# Patient Record
Sex: MALE | Race: BLACK OR AFRICAN AMERICAN | NOT HISPANIC OR LATINO | Employment: OTHER | URBAN - METROPOLITAN AREA
[De-identification: names, ages, dates, MRNs, and addresses within clinical notes are randomized per-mention and may not be internally consistent; named-entity substitution may affect disease eponyms.]

---

## 2017-02-13 ENCOUNTER — HOSPITAL ENCOUNTER (OUTPATIENT)
Dept: RADIOLOGY | Facility: HOSPITAL | Age: 62
Discharge: HOME | End: 2017-02-13
Attending: INTERNAL MEDICINE
Payer: COMMERCIAL

## 2017-02-13 DIAGNOSIS — C61 MALIGNANT NEOPLASM OF PROSTATE: ICD-10-CM

## 2017-02-13 PROCEDURE — 78306 BONE IMAGING WHOLE BODY: CPT | Mod: 26,,, | Performed by: RADIOLOGY

## 2017-02-13 PROCEDURE — A9503 TC99M MEDRONATE: HCPCS

## 2019-10-02 ENCOUNTER — OFFICE VISIT (OUTPATIENT)
Dept: INTERNAL MEDICINE | Facility: CLINIC | Age: 64
End: 2019-10-02
Payer: MEDICARE

## 2019-10-02 VITALS
DIASTOLIC BLOOD PRESSURE: 80 MMHG | WEIGHT: 291.69 LBS | OXYGEN SATURATION: 98 % | HEART RATE: 80 BPM | SYSTOLIC BLOOD PRESSURE: 140 MMHG

## 2019-10-02 DIAGNOSIS — E11.3292 TYPE 2 DIABETES MELLITUS WITH MILD NONPROLIFERATIVE RETINOPATHY OF LEFT EYE, WITH LONG-TERM CURRENT USE OF INSULIN, MACULAR EDEMA PRESENCE UNSPECIFIED: ICD-10-CM

## 2019-10-02 DIAGNOSIS — Z79.4 TYPE 2 DIABETES MELLITUS WITH MILD NONPROLIFERATIVE RETINOPATHY OF LEFT EYE, WITH LONG-TERM CURRENT USE OF INSULIN, MACULAR EDEMA PRESENCE UNSPECIFIED: ICD-10-CM

## 2019-10-02 DIAGNOSIS — Z76.89 ENCOUNTER TO ESTABLISH CARE: Primary | ICD-10-CM

## 2019-10-02 DIAGNOSIS — I10 ESSENTIAL HYPERTENSION: ICD-10-CM

## 2019-10-02 DIAGNOSIS — Z85.46 HISTORY OF PROSTATE CANCER: ICD-10-CM

## 2019-10-02 PROBLEM — N52.9 ED (ERECTILE DYSFUNCTION): Status: ACTIVE | Noted: 2019-10-02

## 2019-10-02 PROCEDURE — 99999 PR PBB SHADOW E&M-EST. PATIENT-LVL IV: ICD-10-PCS | Mod: PBBFAC,,, | Performed by: PHYSICIAN ASSISTANT

## 2019-10-02 PROCEDURE — 1101F PR PT FALLS ASSESS DOC 0-1 FALLS W/OUT INJ PAST YR: ICD-10-PCS | Mod: CPTII,S$GLB,, | Performed by: PHYSICIAN ASSISTANT

## 2019-10-02 PROCEDURE — 1101F PT FALLS ASSESS-DOCD LE1/YR: CPT | Mod: CPTII,S$GLB,, | Performed by: PHYSICIAN ASSISTANT

## 2019-10-02 PROCEDURE — 99999 PR PBB SHADOW E&M-EST. PATIENT-LVL IV: CPT | Mod: PBBFAC,,, | Performed by: PHYSICIAN ASSISTANT

## 2019-10-02 PROCEDURE — 99203 OFFICE O/P NEW LOW 30 MIN: CPT | Mod: S$GLB,,, | Performed by: PHYSICIAN ASSISTANT

## 2019-10-02 PROCEDURE — 99203 PR OFFICE/OUTPT VISIT, NEW, LEVL III, 30-44 MIN: ICD-10-PCS | Mod: S$GLB,,, | Performed by: PHYSICIAN ASSISTANT

## 2019-10-02 RX ORDER — ASPIRIN 81 MG/1
81 TABLET ORAL DAILY
COMMUNITY

## 2019-10-02 RX ORDER — SENNOSIDES 8.6 MG/1
1 TABLET ORAL DAILY
COMMUNITY
End: 2022-10-25

## 2019-10-02 RX ORDER — ATORVASTATIN CALCIUM 80 MG/1
80 TABLET, FILM COATED ORAL DAILY
COMMUNITY

## 2019-10-02 RX ORDER — INSULIN GLARGINE 100 [IU]/ML
20 INJECTION, SOLUTION SUBCUTANEOUS DAILY
COMMUNITY

## 2019-10-02 RX ORDER — LANOLIN ALCOHOL/MO/W.PET/CERES
100 CREAM (GRAM) TOPICAL DAILY
COMMUNITY

## 2019-10-02 RX ORDER — SILDENAFIL 100 MG/1
100 TABLET, FILM COATED ORAL DAILY PRN
COMMUNITY

## 2019-10-02 RX ORDER — INSULIN ASPART 100 [IU]/ML
5 INJECTION, SUSPENSION SUBCUTANEOUS
COMMUNITY
End: 2021-03-11

## 2019-10-02 RX ORDER — SENNOSIDES 8.6 MG/1
1 TABLET ORAL DAILY
COMMUNITY

## 2019-10-02 RX ORDER — SENNOSIDES 8.6 MG/1
1 TABLET ORAL DAILY
COMMUNITY
End: 2021-03-11

## 2019-10-03 NOTE — PROGRESS NOTES
Subjective:       Patient ID: Russell Warner is a 64 y.o. male.    Chief Complaint: Follow-up    HPI    Established pt of Desirae Barrios MD (new to me)    [facility], [facility]o and [facility]).     He had an eye exam earlier today and he was told to bring th results to a primary care provider. He chose [facility], recently rcvd Medicare as he turned 64 today. He is unsure if he would like to transfer care from the [facility]. Main concern is following up on his eye exam today.     I reviewed report (copy below)  That reports recommend of carotid doppler 2/2 asymmetric NPDR that was found on exam.     DM: On Levemir 20units and Aspart 5 units BID. Denies p/p/p. Thinks his last A1c was around 7%. He [facility]    HLD: On Statin therapy Lipitor 80mg    HTN: Unable to recall his BP meds, not listed on med list he has in clinic. /80. Denies cp or sob.    ED: Improved with Viagra PRN    Hx of Prostate Cancer: S/p XRT at [facility] in 2017.            Past Medical History:   Diagnosis Date    Arthritis     Diabetes mellitus, type 2     Hyperlipidemia     Prostate cancer     radiation 2017 at [facility]     Social History     Tobacco Use    Smoking status: Never Smoker    Smokeless tobacco: Never Used   Substance Use Topics    Alcohol use: Not on file     Comment: occ    Drug use: Never     Review of patient's allergies indicates:  No Known Allergies    History reviewed. No pertinent family history.    Past Surgical History:   Procedure Laterality Date    EYE SURGERY      LUNG BIOPSY      SHOULDER SURGERY Right        Current Outpatient Medications:     alcohol antiseptic pads (ALCOHOL PREP PADS TOP), Apply topically., Disp: , Rfl:     aspirin (ECOTRIN) 81 MG EC tablet, Take 81 mg by mouth once daily., Disp: , Rfl:     atorvastatin (LIPITOR) 80 MG tablet, Take 80 mg by mouth once daily., Disp: , Rfl:     blood sugar diagnostic (BLOOD GLUCOSE TEST) Strp, by  Misc.(Non-Drug; Combo Route) route., Disp: , Rfl:     collagenase (SANTYL) ointment, Apply topically 2 (two) times daily., Disp: , Rfl:     cyanocobalamin (VITAMIN B-12) 1000 MCG tablet, Take 100 mcg by mouth once daily., Disp: , Rfl:     insulin aspart protamine-insulin aspart (NOVOLOG 70/30) 100 unit/mL (70-30) InPn pen, Inject 5 Units into the skin 2 (two) times daily before meals., Disp: , Rfl:     insulin glargine (LANTUS) 100 unit/mL injection, Inject 20 Units into the skin once daily., Disp: , Rfl:     lancing device (ACCU-CHEK SOFTCLIX LANCET DEV MISC), by Misc.(Non-Drug; Combo Route) route., Disp: , Rfl:     pen needle, diabetic (NOVOFINE 32 MISC), by Misc.(Non-Drug; Combo Route) route., Disp: , Rfl:     senna (EVAC-U-GEN, SENNOSIDES,) 8.6 mg tablet, Take 1 tablet by mouth once daily., Disp: , Rfl:     senna (EVAC-U-GEN, SENNOSIDES,) 8.6 mg tablet, Take 1 tablet by mouth once daily., Disp: , Rfl:     senna (SENOKOT) 8.6 mg tablet, Take 1 tablet by mouth once daily., Disp: , Rfl:     sildenafil (VIAGRA) 100 MG tablet, Take 100 mg by mouth daily as needed for Erectile Dysfunction., Disp: , Rfl:       Review of Systems   Constitutional: Negative for chills, fever and unexpected weight change.   Respiratory: Negative for cough and shortness of breath.    Cardiovascular: Negative for chest pain and leg swelling.   Gastrointestinal: Positive for constipation (occ). Negative for abdominal pain, nausea and vomiting.   Musculoskeletal: Positive for arthralgias.   Skin: Negative for rash.   Neurological: Negative for weakness and headaches.       Objective: BP (!) 140/80   Pulse 80   Wt 132.3 kg (291 lb 10.7 oz)   SpO2 98%         Physical Exam   Constitutional: He is oriented to person, place, and time. He appears well-developed and well-nourished. No distress.   HENT:   Head: Normocephalic and atraumatic.   Mouth/Throat: Oropharynx is clear and moist.   Eyes: Pupils are equal, round, and reactive to  light.   Cardiovascular: Normal rate and regular rhythm. Exam reveals no friction rub.   No murmur heard.  Pulmonary/Chest: Effort normal and breath sounds normal. He has no wheezes. He has no rales.   Abdominal: Soft. Bowel sounds are normal. There is no tenderness.   Musculoskeletal: He exhibits no edema.   Ambulating with cane   Neurological: He is alert and oriented to person, place, and time.   Skin: Skin is warm and dry. No rash noted.   Psychiatric: He has a normal mood and affect.   Vitals reviewed.      Assessment:       1. Encounter to establish care    2. 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 Essential hypertension    4. History of prostate cancer        Plan:         Russell was seen today for follow-up.    Diagnoses and all orders for this visit:    Encounter to establish care  Has appt to establish with Dr. 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

## 2019-11-13 ENCOUNTER — TELEPHONE (OUTPATIENT)
Dept: INTERNAL MEDICINE | Facility: CLINIC | Age: 64
End: 2019-11-13

## 2019-11-13 NOTE — TELEPHONE ENCOUNTER
Dec 5th appt needs to be rescheduled. If unable to reach on phone please cancel appt and send patient a letter.

## 2019-11-13 NOTE — LETTER
[facility]  [address]  [facility] [address]  Phone: [phone]  Fax: [phone]           Dear Russell Warner,        The office has tried calling you to inform you that we do have to cancel your appointment on December 5th.  will not be in the office on that day. Please give us a call so we can reschedule your appointment. We do apologize for the inconvienience. Thank you!

## 2021-03-11 ENCOUNTER — OFFICE VISIT (OUTPATIENT)
Dept: INTERNAL MEDICINE | Facility: CLINIC | Age: 66
End: 2021-03-11
Payer: MEDICARE

## 2021-03-11 ENCOUNTER — HOSPITAL ENCOUNTER (OUTPATIENT)
Dept: RADIOLOGY | Facility: HOSPITAL | Age: 66
Discharge: HOME | End: 2021-03-11
Attending: INTERNAL MEDICINE
Payer: MEDICARE

## 2021-03-11 VITALS
OXYGEN SATURATION: 98 % | HEART RATE: 77 BPM | BODY MASS INDEX: 37.56 KG/M2 | SYSTOLIC BLOOD PRESSURE: 128 MMHG | WEIGHT: 308.44 LBS | HEIGHT: 76 IN | DIASTOLIC BLOOD PRESSURE: 82 MMHG

## 2021-03-11 DIAGNOSIS — M25.559 HIP PAIN: ICD-10-CM

## 2021-03-11 DIAGNOSIS — I73.9 PERIPHERAL VASCULAR DISEASE: ICD-10-CM

## 2021-03-11 DIAGNOSIS — M17.0 PRIMARY OSTEOARTHRITIS OF BOTH KNEES: ICD-10-CM

## 2021-03-11 DIAGNOSIS — Z89.411 ACQUIRED ABSENCE OF RIGHT GREAT TOE: ICD-10-CM

## 2021-03-11 DIAGNOSIS — Z79.4 TYPE 2 DIABETES MELLITUS WITH MILD NONPROLIFERATIVE RETINOPATHY WITHOUT MACULAR EDEMA, WITH LONG-TERM CURRENT USE OF INSULIN, UNSPECIFIED LATERALITY: ICD-10-CM

## 2021-03-11 DIAGNOSIS — Z00.00 ANNUAL PHYSICAL EXAM: Primary | ICD-10-CM

## 2021-03-11 DIAGNOSIS — E11.3299 TYPE 2 DIABETES MELLITUS WITH MILD NONPROLIFERATIVE RETINOPATHY WITHOUT MACULAR EDEMA, WITH LONG-TERM CURRENT USE OF INSULIN, UNSPECIFIED LATERALITY: ICD-10-CM

## 2021-03-11 DIAGNOSIS — M54.9 CHRONIC BACK PAIN, UNSPECIFIED BACK LOCATION, UNSPECIFIED BACK PAIN LATERALITY: ICD-10-CM

## 2021-03-11 DIAGNOSIS — E66.01 MORBID (SEVERE) OBESITY DUE TO EXCESS CALORIES: ICD-10-CM

## 2021-03-11 DIAGNOSIS — G89.29 CHRONIC BACK PAIN, UNSPECIFIED BACK LOCATION, UNSPECIFIED BACK PAIN LATERALITY: ICD-10-CM

## 2021-03-11 DIAGNOSIS — M79.673 PAIN OF FOOT, UNSPECIFIED LATERALITY: ICD-10-CM

## 2021-03-11 DIAGNOSIS — M25.511 RIGHT SHOULDER PAIN, UNSPECIFIED CHRONICITY: ICD-10-CM

## 2021-03-11 DIAGNOSIS — Z11.59 ENCOUNTER FOR HEPATITIS C SCREENING TEST FOR LOW RISK PATIENT: ICD-10-CM

## 2021-03-11 LAB
ALBUMIN/CREAT UR: 100.3 UG/MG (ref 0–30)
BACTERIA #/AREA URNS AUTO: ABNORMAL /HPF
BILIRUB UR QL STRIP: NEGATIVE
CLARITY UR REFRACT.AUTO: ABNORMAL
COLOR UR AUTO: YELLOW
CREAT UR-MCNC: 352 MG/DL (ref 23–375)
GLUCOSE UR QL STRIP: NEGATIVE
HGB UR QL STRIP: NEGATIVE
HYALINE CASTS UR QL AUTO: 3 /LPF
KETONES UR QL STRIP: NEGATIVE
LEUKOCYTE ESTERASE UR QL STRIP: NEGATIVE
MICROALBUMIN UR DL<=1MG/L-MCNC: 353 UG/ML
MICROSCOPIC COMMENT: ABNORMAL
NITRITE UR QL STRIP: NEGATIVE
PH UR STRIP: 5 [PH] (ref 5–8)
PROT UR QL STRIP: ABNORMAL
RBC #/AREA URNS AUTO: 2 /HPF (ref 0–4)
SP GR UR STRIP: 1.02 (ref 1–1.03)
SQUAMOUS #/AREA URNS AUTO: 0 /HPF
URN SPEC COLLECT METH UR: ABNORMAL
WBC #/AREA URNS AUTO: 2 /HPF (ref 0–5)

## 2021-03-11 PROCEDURE — 73564 XR KNEE COMP 4 OR MORE VIEWS BILAT: ICD-10-PCS | Mod: 26,50,, | Performed by: RADIOLOGY

## 2021-03-11 PROCEDURE — 73564 X-RAY EXAM KNEE 4 OR MORE: CPT | Mod: TC,50

## 2021-03-11 PROCEDURE — 73502 X-RAY EXAM HIP UNI 2-3 VIEWS: CPT | Mod: TC,RT

## 2021-03-11 PROCEDURE — 1125F AMNT PAIN NOTED PAIN PRSNT: CPT | Mod: S$GLB,,, | Performed by: INTERNAL MEDICINE

## 2021-03-11 PROCEDURE — 99214 PR OFFICE/OUTPT VISIT, EST, LEVL IV, 30-39 MIN: ICD-10-PCS | Mod: S$GLB,,, | Performed by: INTERNAL MEDICINE

## 2021-03-11 PROCEDURE — 3079F PR MOST RECENT DIASTOLIC BLOOD PRESSURE 80-89 MM HG: ICD-10-PCS | Mod: CPTII,S$GLB,, | Performed by: INTERNAL MEDICINE

## 2021-03-11 PROCEDURE — 99214 OFFICE O/P EST MOD 30 MIN: CPT | Mod: S$GLB,,, | Performed by: INTERNAL MEDICINE

## 2021-03-11 PROCEDURE — 3008F BODY MASS INDEX DOCD: CPT | Mod: CPTII,S$GLB,, | Performed by: INTERNAL MEDICINE

## 2021-03-11 PROCEDURE — 3079F DIAST BP 80-89 MM HG: CPT | Mod: CPTII,S$GLB,, | Performed by: INTERNAL MEDICINE

## 2021-03-11 PROCEDURE — 73502 X-RAY EXAM HIP UNI 2-3 VIEWS: CPT | Mod: 26,RT,, | Performed by: RADIOLOGY

## 2021-03-11 PROCEDURE — 1101F PR PT FALLS ASSESS DOC 0-1 FALLS W/OUT INJ PAST YR: ICD-10-PCS | Mod: CPTII,S$GLB,, | Performed by: INTERNAL MEDICINE

## 2021-03-11 PROCEDURE — 73564 X-RAY EXAM KNEE 4 OR MORE: CPT | Mod: 26,50,, | Performed by: RADIOLOGY

## 2021-03-11 PROCEDURE — 99999 PR PBB SHADOW E&M-EST. PATIENT-LVL V: ICD-10-PCS | Mod: PBBFAC,,, | Performed by: INTERNAL MEDICINE

## 2021-03-11 PROCEDURE — 3288F PR FALLS RISK ASSESSMENT DOCUMENTED: ICD-10-PCS | Mod: CPTII,S$GLB,, | Performed by: INTERNAL MEDICINE

## 2021-03-11 PROCEDURE — 73502 XR HIP 2 VIEW RIGHT: ICD-10-PCS | Mod: 26,RT,, | Performed by: RADIOLOGY

## 2021-03-11 PROCEDURE — 1125F PR PAIN SEVERITY QUANTIFIED, PAIN PRESENT: ICD-10-PCS | Mod: S$GLB,,, | Performed by: INTERNAL MEDICINE

## 2021-03-11 PROCEDURE — 3008F PR BODY MASS INDEX (BMI) DOCUMENTED: ICD-10-PCS | Mod: CPTII,S$GLB,, | Performed by: INTERNAL MEDICINE

## 2021-03-11 PROCEDURE — 82043 UR ALBUMIN QUANTITATIVE: CPT | Performed by: INTERNAL MEDICINE

## 2021-03-11 PROCEDURE — 82570 ASSAY OF URINE CREATININE: CPT | Performed by: INTERNAL MEDICINE

## 2021-03-11 PROCEDURE — 3288F FALL RISK ASSESSMENT DOCD: CPT | Mod: CPTII,S$GLB,, | Performed by: INTERNAL MEDICINE

## 2021-03-11 PROCEDURE — 3074F SYST BP LT 130 MM HG: CPT | Mod: CPTII,S$GLB,, | Performed by: INTERNAL MEDICINE

## 2021-03-11 PROCEDURE — 81001 URINALYSIS AUTO W/SCOPE: CPT | Performed by: INTERNAL MEDICINE

## 2021-03-11 PROCEDURE — 3074F PR MOST RECENT SYSTOLIC BLOOD PRESSURE < 130 MM HG: ICD-10-PCS | Mod: CPTII,S$GLB,, | Performed by: INTERNAL MEDICINE

## 2021-03-11 PROCEDURE — 1101F PT FALLS ASSESS-DOCD LE1/YR: CPT | Mod: CPTII,S$GLB,, | Performed by: INTERNAL MEDICINE

## 2021-03-11 PROCEDURE — 99999 PR PBB SHADOW E&M-EST. PATIENT-LVL V: CPT | Mod: PBBFAC,,, | Performed by: INTERNAL MEDICINE

## 2021-03-11 RX ORDER — INSULIN ASPART 100 [IU]/ML
10 INJECTION, SOLUTION INTRAVENOUS; SUBCUTANEOUS
Qty: 9 ML | Refills: 11
Start: 2021-03-11 | End: 2022-10-25

## 2021-03-13 ENCOUNTER — IMMUNIZATION (OUTPATIENT)
Dept: PRIMARY CARE CLINIC | Facility: CLINIC | Age: 66
End: 2021-03-13
Payer: MEDICARE

## 2021-03-13 DIAGNOSIS — Z23 NEED FOR VACCINATION: Primary | ICD-10-CM

## 2021-03-13 PROCEDURE — 91300 PR SARS-COV- 2 COVID-19 VACCINE, NO PRSV, 30MCG/0.3ML, IM: ICD-10-PCS | Mod: S$GLB,,, | Performed by: INTERNAL MEDICINE

## 2021-03-13 PROCEDURE — 0001A PR IMMUNIZ ADMIN, SARS-COV-2 COVID-19 VACC, 30MCG/0.3ML, 1ST DOSE: ICD-10-PCS | Mod: CV19,S$GLB,, | Performed by: INTERNAL MEDICINE

## 2021-03-13 PROCEDURE — 0001A PR IMMUNIZ ADMIN, SARS-COV-2 COVID-19 VACC, 30MCG/0.3ML, 1ST DOSE: CPT | Mod: CV19,S$GLB,, | Performed by: INTERNAL MEDICINE

## 2021-03-13 PROCEDURE — 91300 PR SARS-COV- 2 COVID-19 VACCINE, NO PRSV, 30MCG/0.3ML, IM: CPT | Mod: S$GLB,,, | Performed by: INTERNAL MEDICINE

## 2021-03-13 RX ADMIN — Medication 0.3 ML: at 11:03

## 2021-03-16 ENCOUNTER — TELEPHONE (OUTPATIENT)
Dept: INTERNAL MEDICINE | Facility: CLINIC | Age: 66
End: 2021-03-16

## 2021-03-16 DIAGNOSIS — D64.9 ANEMIA, UNSPECIFIED TYPE: Primary | ICD-10-CM

## 2021-03-18 ENCOUNTER — OFFICE VISIT (OUTPATIENT)
Dept: ORTHOPEDICS | Facility: CLINIC | Age: 66
End: 2021-03-18
Payer: MEDICARE

## 2021-03-18 ENCOUNTER — TELEPHONE (OUTPATIENT)
Dept: INTERNAL MEDICINE | Facility: CLINIC | Age: 66
End: 2021-03-18

## 2021-03-18 ENCOUNTER — TELEPHONE (OUTPATIENT)
Dept: ORTHOPEDICS | Facility: CLINIC | Age: 66
End: 2021-03-18

## 2021-03-18 ENCOUNTER — PATIENT OUTREACH (OUTPATIENT)
Dept: ADMINISTRATIVE | Facility: OTHER | Age: 66
End: 2021-03-18

## 2021-03-18 ENCOUNTER — HOSPITAL ENCOUNTER (OUTPATIENT)
Dept: RADIOLOGY | Facility: HOSPITAL | Age: 66
Discharge: HOME | End: 2021-03-18
Attending: ORTHOPAEDIC SURGERY
Payer: MEDICARE

## 2021-03-18 VITALS — HEIGHT: 76 IN | BODY MASS INDEX: 36.67 KG/M2 | WEIGHT: 301.13 LBS

## 2021-03-18 DIAGNOSIS — M25.511 RIGHT SHOULDER PAIN, UNSPECIFIED CHRONICITY: ICD-10-CM

## 2021-03-18 DIAGNOSIS — M47.816 LUMBAR SPONDYLOSIS: Primary | ICD-10-CM

## 2021-03-18 DIAGNOSIS — M17.0 PRIMARY OSTEOARTHRITIS OF BOTH KNEES: ICD-10-CM

## 2021-03-18 DIAGNOSIS — M12.811 ROTATOR CUFF ARTHROPATHY OF RIGHT SHOULDER: ICD-10-CM

## 2021-03-18 DIAGNOSIS — E78.5 HYPERLIPIDEMIA, UNSPECIFIED HYPERLIPIDEMIA TYPE: Primary | ICD-10-CM

## 2021-03-18 PROCEDURE — 1125F AMNT PAIN NOTED PAIN PRSNT: CPT | Mod: S$GLB,,, | Performed by: ORTHOPAEDIC SURGERY

## 2021-03-18 PROCEDURE — 3288F PR FALLS RISK ASSESSMENT DOCUMENTED: ICD-10-PCS | Mod: CPTII,S$GLB,, | Performed by: ORTHOPAEDIC SURGERY

## 2021-03-18 PROCEDURE — 3008F PR BODY MASS INDEX (BMI) DOCUMENTED: ICD-10-PCS | Mod: CPTII,S$GLB,, | Performed by: ORTHOPAEDIC SURGERY

## 2021-03-18 PROCEDURE — 99999 PR PBB SHADOW E&M-EST. PATIENT-LVL IV: ICD-10-PCS | Mod: PBBFAC,,, | Performed by: ORTHOPAEDIC SURGERY

## 2021-03-18 PROCEDURE — 99499 NO LOS: ICD-10-PCS | Mod: S$GLB,,, | Performed by: PHYSICIAN ASSISTANT

## 2021-03-18 PROCEDURE — 1100F PR PT FALLS ASSESS DOC 2+ FALLS/FALL W/INJURY/YR: ICD-10-PCS | Mod: CPTII,S$GLB,, | Performed by: ORTHOPAEDIC SURGERY

## 2021-03-18 PROCEDURE — 1125F PR PAIN SEVERITY QUANTIFIED, PAIN PRESENT: ICD-10-PCS | Mod: S$GLB,,, | Performed by: ORTHOPAEDIC SURGERY

## 2021-03-18 PROCEDURE — 1159F MED LIST DOCD IN RCRD: CPT | Mod: S$GLB,,, | Performed by: ORTHOPAEDIC SURGERY

## 2021-03-18 PROCEDURE — 99999 PR PBB SHADOW E&M-EST. PATIENT-LVL IV: CPT | Mod: PBBFAC,,, | Performed by: ORTHOPAEDIC SURGERY

## 2021-03-18 PROCEDURE — 73030 X-RAY EXAM OF SHOULDER: CPT | Mod: TC,RT

## 2021-03-18 PROCEDURE — 3008F BODY MASS INDEX DOCD: CPT | Mod: CPTII,S$GLB,, | Performed by: ORTHOPAEDIC SURGERY

## 2021-03-18 PROCEDURE — 99499 UNLISTED E&M SERVICE: CPT | Mod: S$GLB,,, | Performed by: PHYSICIAN ASSISTANT

## 2021-03-18 PROCEDURE — 73030 X-RAY EXAM OF SHOULDER: CPT | Mod: 26,RT,, | Performed by: RADIOLOGY

## 2021-03-18 PROCEDURE — 99203 PR OFFICE/OUTPT VISIT, NEW, LEVL III, 30-44 MIN: ICD-10-PCS | Mod: S$GLB,,, | Performed by: ORTHOPAEDIC SURGERY

## 2021-03-18 PROCEDURE — 99203 OFFICE O/P NEW LOW 30 MIN: CPT | Mod: S$GLB,,, | Performed by: ORTHOPAEDIC SURGERY

## 2021-03-18 PROCEDURE — 73030 XR SHOULDER TRAUMA 3 VIEW RIGHT: ICD-10-PCS | Mod: 26,RT,, | Performed by: RADIOLOGY

## 2021-03-18 PROCEDURE — 1159F PR MEDICATION LIST DOCUMENTED IN MEDICAL RECORD: ICD-10-PCS | Mod: S$GLB,,, | Performed by: ORTHOPAEDIC SURGERY

## 2021-03-18 PROCEDURE — 3288F FALL RISK ASSESSMENT DOCD: CPT | Mod: CPTII,S$GLB,, | Performed by: ORTHOPAEDIC SURGERY

## 2021-03-18 PROCEDURE — 1100F PTFALLS ASSESS-DOCD GE2>/YR: CPT | Mod: CPTII,S$GLB,, | Performed by: ORTHOPAEDIC SURGERY

## 2021-03-18 RX ORDER — EZETIMIBE 10 MG/1
10 TABLET ORAL DAILY
Qty: 90 TABLET | Refills: 3 | Status: SHIPPED | OUTPATIENT
Start: 2021-03-18 | End: 2022-03-18

## 2021-03-18 RX ORDER — LORAZEPAM 0.5 MG/1
TABLET ORAL
Qty: 1 TABLET | Refills: 0 | Status: SHIPPED | OUTPATIENT
Start: 2021-03-18 | End: 2022-10-25

## 2021-03-19 PROBLEM — M47.816 LUMBAR SPONDYLOSIS: Status: ACTIVE | Noted: 2021-03-19

## 2021-03-19 PROBLEM — M12.811 ROTATOR CUFF ARTHROPATHY OF RIGHT SHOULDER: Status: ACTIVE | Noted: 2021-03-19

## 2021-03-21 ENCOUNTER — TELEPHONE (OUTPATIENT)
Dept: INTERNAL MEDICINE | Facility: CLINIC | Age: 66
End: 2021-03-21

## 2021-03-23 ENCOUNTER — OFFICE VISIT (OUTPATIENT)
Dept: ORTHOPEDICS | Facility: CLINIC | Age: 66
End: 2021-03-23
Payer: MEDICARE

## 2021-03-23 ENCOUNTER — HOSPITAL ENCOUNTER (OUTPATIENT)
Dept: RADIOLOGY | Facility: HOSPITAL | Age: 66
Discharge: HOME | End: 2021-03-23
Attending: ORTHOPAEDIC SURGERY
Payer: MEDICARE

## 2021-03-23 VITALS — WEIGHT: 299.94 LBS | HEIGHT: 76 IN | BODY MASS INDEX: 36.52 KG/M2

## 2021-03-23 DIAGNOSIS — M54.9 CHRONIC BACK PAIN, UNSPECIFIED BACK LOCATION, UNSPECIFIED BACK PAIN LATERALITY: ICD-10-CM

## 2021-03-23 DIAGNOSIS — M51.36 DDD (DEGENERATIVE DISC DISEASE), LUMBAR: ICD-10-CM

## 2021-03-23 DIAGNOSIS — G89.29 CHRONIC BACK PAIN, UNSPECIFIED BACK LOCATION, UNSPECIFIED BACK PAIN LATERALITY: ICD-10-CM

## 2021-03-23 PROCEDURE — 72120 X-RAY BEND ONLY L-S SPINE: CPT | Mod: TC

## 2021-03-23 PROCEDURE — 72100 X-RAY EXAM L-S SPINE 2/3 VWS: CPT | Mod: 26,,, | Performed by: RADIOLOGY

## 2021-03-23 PROCEDURE — 72120 X-RAY BEND ONLY L-S SPINE: CPT | Mod: 26,,, | Performed by: RADIOLOGY

## 2021-03-23 PROCEDURE — 3008F BODY MASS INDEX DOCD: CPT | Mod: CPTII,S$GLB,, | Performed by: ORTHOPAEDIC SURGERY

## 2021-03-23 PROCEDURE — 72100 XR LUMBAR SPINE AP AND LAT WITH FLEX/EXT: ICD-10-PCS | Mod: 26,,, | Performed by: RADIOLOGY

## 2021-03-23 PROCEDURE — 3288F PR FALLS RISK ASSESSMENT DOCUMENTED: ICD-10-PCS | Mod: CPTII,S$GLB,, | Performed by: ORTHOPAEDIC SURGERY

## 2021-03-23 PROCEDURE — 72120 XR LUMBAR SPINE AP AND LAT WITH FLEX/EXT: ICD-10-PCS | Mod: 26,,, | Performed by: RADIOLOGY

## 2021-03-23 PROCEDURE — 3288F FALL RISK ASSESSMENT DOCD: CPT | Mod: CPTII,S$GLB,, | Performed by: ORTHOPAEDIC SURGERY

## 2021-03-23 PROCEDURE — 1100F PR PT FALLS ASSESS DOC 2+ FALLS/FALL W/INJURY/YR: ICD-10-PCS | Mod: CPTII,S$GLB,, | Performed by: ORTHOPAEDIC SURGERY

## 2021-03-23 PROCEDURE — 1100F PTFALLS ASSESS-DOCD GE2>/YR: CPT | Mod: CPTII,S$GLB,, | Performed by: ORTHOPAEDIC SURGERY

## 2021-03-23 PROCEDURE — 99213 OFFICE O/P EST LOW 20 MIN: CPT | Mod: S$GLB,,, | Performed by: ORTHOPAEDIC SURGERY

## 2021-03-23 PROCEDURE — 1159F PR MEDICATION LIST DOCUMENTED IN MEDICAL RECORD: ICD-10-PCS | Mod: S$GLB,,, | Performed by: ORTHOPAEDIC SURGERY

## 2021-03-23 PROCEDURE — 99999 PR PBB SHADOW E&M-EST. PATIENT-LVL IV: CPT | Mod: PBBFAC,,, | Performed by: ORTHOPAEDIC SURGERY

## 2021-03-23 PROCEDURE — 1125F PR PAIN SEVERITY QUANTIFIED, PAIN PRESENT: ICD-10-PCS | Mod: S$GLB,,, | Performed by: ORTHOPAEDIC SURGERY

## 2021-03-23 PROCEDURE — 1125F AMNT PAIN NOTED PAIN PRSNT: CPT | Mod: S$GLB,,, | Performed by: ORTHOPAEDIC SURGERY

## 2021-03-23 PROCEDURE — 1159F MED LIST DOCD IN RCRD: CPT | Mod: S$GLB,,, | Performed by: ORTHOPAEDIC SURGERY

## 2021-03-23 PROCEDURE — 99213 PR OFFICE/OUTPT VISIT, EST, LEVL III, 20-29 MIN: ICD-10-PCS | Mod: S$GLB,,, | Performed by: ORTHOPAEDIC SURGERY

## 2021-03-23 PROCEDURE — 99999 PR PBB SHADOW E&M-EST. PATIENT-LVL IV: ICD-10-PCS | Mod: PBBFAC,,, | Performed by: ORTHOPAEDIC SURGERY

## 2021-03-23 PROCEDURE — 3008F PR BODY MASS INDEX (BMI) DOCUMENTED: ICD-10-PCS | Mod: CPTII,S$GLB,, | Performed by: ORTHOPAEDIC SURGERY

## 2021-03-23 RX ORDER — GABAPENTIN 100 MG/1
100 CAPSULE ORAL 3 TIMES DAILY
Qty: 90 CAPSULE | Refills: 11 | Status: SHIPPED | OUTPATIENT
Start: 2021-03-23 | End: 2022-10-25

## 2021-03-24 DIAGNOSIS — Z12.11 COLON CANCER SCREENING: ICD-10-CM

## 2021-03-25 ENCOUNTER — TELEPHONE (OUTPATIENT)
Dept: SPORTS MEDICINE | Facility: CLINIC | Age: 66
End: 2021-03-25

## 2021-03-29 ENCOUNTER — PATIENT OUTREACH (OUTPATIENT)
Dept: ADMINISTRATIVE | Facility: HOSPITAL | Age: 66
End: 2021-03-29

## 2021-03-30 ENCOUNTER — OFFICE VISIT (OUTPATIENT)
Dept: SPORTS MEDICINE | Facility: CLINIC | Age: 66
End: 2021-03-30
Payer: MEDICARE

## 2021-03-30 ENCOUNTER — HOSPITAL ENCOUNTER (OUTPATIENT)
Dept: RADIOLOGY | Facility: HOSPITAL | Age: 66
Discharge: HOME | End: 2021-03-30
Attending: ORTHOPAEDIC SURGERY
Payer: MEDICARE

## 2021-03-30 VITALS
SYSTOLIC BLOOD PRESSURE: 174 MMHG | HEART RATE: 71 BPM | WEIGHT: 306.5 LBS | BODY MASS INDEX: 37.32 KG/M2 | HEIGHT: 76 IN | DIASTOLIC BLOOD PRESSURE: 89 MMHG

## 2021-03-30 DIAGNOSIS — M25.511 CHRONIC RIGHT SHOULDER PAIN: ICD-10-CM

## 2021-03-30 DIAGNOSIS — M12.811 ROTATOR CUFF ARTHROPATHY OF RIGHT SHOULDER: ICD-10-CM

## 2021-03-30 DIAGNOSIS — M12.811 ROTATOR CUFF ARTHROPATHY OF RIGHT SHOULDER: Primary | ICD-10-CM

## 2021-03-30 DIAGNOSIS — G89.29 CHRONIC RIGHT SHOULDER PAIN: ICD-10-CM

## 2021-03-30 PROCEDURE — 3008F BODY MASS INDEX DOCD: CPT | Mod: CPTII,S$GLB,, | Performed by: ORTHOPAEDIC SURGERY

## 2021-03-30 PROCEDURE — 73030 X-RAY EXAM OF SHOULDER: CPT | Mod: TC,RT

## 2021-03-30 PROCEDURE — 99204 OFFICE O/P NEW MOD 45 MIN: CPT | Mod: S$GLB,,, | Performed by: ORTHOPAEDIC SURGERY

## 2021-03-30 PROCEDURE — 3288F FALL RISK ASSESSMENT DOCD: CPT | Mod: CPTII,S$GLB,, | Performed by: ORTHOPAEDIC SURGERY

## 2021-03-30 PROCEDURE — 3288F PR FALLS RISK ASSESSMENT DOCUMENTED: ICD-10-PCS | Mod: CPTII,S$GLB,, | Performed by: ORTHOPAEDIC SURGERY

## 2021-03-30 PROCEDURE — 73030 X-RAY EXAM OF SHOULDER: CPT | Mod: 26,RT,, | Performed by: RADIOLOGY

## 2021-03-30 PROCEDURE — 99204 PR OFFICE/OUTPT VISIT, NEW, LEVL IV, 45-59 MIN: ICD-10-PCS | Mod: S$GLB,,, | Performed by: ORTHOPAEDIC SURGERY

## 2021-03-30 PROCEDURE — 3008F PR BODY MASS INDEX (BMI) DOCUMENTED: ICD-10-PCS | Mod: CPTII,S$GLB,, | Performed by: ORTHOPAEDIC SURGERY

## 2021-03-30 PROCEDURE — 1125F AMNT PAIN NOTED PAIN PRSNT: CPT | Mod: S$GLB,,, | Performed by: ORTHOPAEDIC SURGERY

## 2021-03-30 PROCEDURE — 1125F PR PAIN SEVERITY QUANTIFIED, PAIN PRESENT: ICD-10-PCS | Mod: S$GLB,,, | Performed by: ORTHOPAEDIC SURGERY

## 2021-03-30 PROCEDURE — 1101F PT FALLS ASSESS-DOCD LE1/YR: CPT | Mod: CPTII,S$GLB,, | Performed by: ORTHOPAEDIC SURGERY

## 2021-03-30 PROCEDURE — 99999 PR PBB SHADOW E&M-EST. PATIENT-LVL IV: CPT | Mod: PBBFAC,,, | Performed by: ORTHOPAEDIC SURGERY

## 2021-03-30 PROCEDURE — 1159F MED LIST DOCD IN RCRD: CPT | Mod: S$GLB,,, | Performed by: ORTHOPAEDIC SURGERY

## 2021-03-30 PROCEDURE — 99999 PR PBB SHADOW E&M-EST. PATIENT-LVL IV: ICD-10-PCS | Mod: PBBFAC,,, | Performed by: ORTHOPAEDIC SURGERY

## 2021-03-30 PROCEDURE — 73030 XR SHOULDER COMPLETE 2 OR MORE VIEWS RIGHT: ICD-10-PCS | Mod: 26,RT,, | Performed by: RADIOLOGY

## 2021-03-30 PROCEDURE — 1101F PR PT FALLS ASSESS DOC 0-1 FALLS W/OUT INJ PAST YR: ICD-10-PCS | Mod: CPTII,S$GLB,, | Performed by: ORTHOPAEDIC SURGERY

## 2021-03-30 PROCEDURE — 1159F PR MEDICATION LIST DOCUMENTED IN MEDICAL RECORD: ICD-10-PCS | Mod: S$GLB,,, | Performed by: ORTHOPAEDIC SURGERY

## 2021-04-03 ENCOUNTER — IMMUNIZATION (OUTPATIENT)
Dept: PRIMARY CARE CLINIC | Facility: CLINIC | Age: 66
End: 2021-04-03
Payer: MEDICARE

## 2021-04-03 DIAGNOSIS — Z23 NEED FOR VACCINATION: Primary | ICD-10-CM

## 2021-04-03 PROCEDURE — 91300 PR SARS-COV- 2 COVID-19 VACCINE, NO PRSV, 30MCG/0.3ML, IM: CPT | Mod: S$GLB,,, | Performed by: INTERNAL MEDICINE

## 2021-04-03 PROCEDURE — 0002A PR IMMUNIZ ADMIN, SARS-COV-2 COVID-19 VACC, 30MCG/0.3ML, 2ND DOSE: ICD-10-PCS | Mod: CV19,S$GLB,, | Performed by: INTERNAL MEDICINE

## 2021-04-03 PROCEDURE — 0002A PR IMMUNIZ ADMIN, SARS-COV-2 COVID-19 VACC, 30MCG/0.3ML, 2ND DOSE: CPT | Mod: CV19,S$GLB,, | Performed by: INTERNAL MEDICINE

## 2021-04-03 PROCEDURE — 91300 PR SARS-COV- 2 COVID-19 VACCINE, NO PRSV, 30MCG/0.3ML, IM: ICD-10-PCS | Mod: S$GLB,,, | Performed by: INTERNAL MEDICINE

## 2021-04-03 RX ADMIN — Medication 0.3 ML: at 10:04

## 2021-04-08 ENCOUNTER — OFFICE VISIT (OUTPATIENT)
Dept: PODIATRY | Facility: CLINIC | Age: 66
End: 2021-04-08
Payer: MEDICARE

## 2021-04-08 VITALS
DIASTOLIC BLOOD PRESSURE: 76 MMHG | HEART RATE: 76 BPM | BODY MASS INDEX: 38.36 KG/M2 | WEIGHT: 315 LBS | HEIGHT: 76 IN | SYSTOLIC BLOOD PRESSURE: 148 MMHG

## 2021-04-08 DIAGNOSIS — E11.42 TYPE 2 DIABETES MELLITUS WITH POLYNEUROPATHY: ICD-10-CM

## 2021-04-08 PROCEDURE — 3046F HEMOGLOBIN A1C LEVEL >9.0%: CPT | Mod: CPTII,S$GLB,, | Performed by: PODIATRIST

## 2021-04-08 PROCEDURE — 1126F AMNT PAIN NOTED NONE PRSNT: CPT | Mod: S$GLB,,, | Performed by: PODIATRIST

## 2021-04-08 PROCEDURE — 3008F PR BODY MASS INDEX (BMI) DOCUMENTED: ICD-10-PCS | Mod: CPTII,S$GLB,, | Performed by: PODIATRIST

## 2021-04-08 PROCEDURE — 1159F PR MEDICATION LIST DOCUMENTED IN MEDICAL RECORD: ICD-10-PCS | Mod: S$GLB,,, | Performed by: PODIATRIST

## 2021-04-08 PROCEDURE — 3008F BODY MASS INDEX DOCD: CPT | Mod: CPTII,S$GLB,, | Performed by: PODIATRIST

## 2021-04-08 PROCEDURE — 99999 PR PBB SHADOW E&M-EST. PATIENT-LVL IV: CPT | Mod: PBBFAC,,, | Performed by: PODIATRIST

## 2021-04-08 PROCEDURE — 99203 OFFICE O/P NEW LOW 30 MIN: CPT | Mod: S$GLB,,, | Performed by: PODIATRIST

## 2021-04-08 PROCEDURE — 99499 RISK ADDL DX/OHS AUDIT: ICD-10-PCS | Mod: S$GLB,,, | Performed by: PODIATRIST

## 2021-04-08 PROCEDURE — 99999 PR PBB SHADOW E&M-EST. PATIENT-LVL IV: ICD-10-PCS | Mod: PBBFAC,,, | Performed by: PODIATRIST

## 2021-04-08 PROCEDURE — 99499 UNLISTED E&M SERVICE: CPT | Mod: S$GLB,,, | Performed by: PODIATRIST

## 2021-04-08 PROCEDURE — 3046F PR MOST RECENT HEMOGLOBIN A1C LEVEL > 9.0%: ICD-10-PCS | Mod: CPTII,S$GLB,, | Performed by: PODIATRIST

## 2021-04-08 PROCEDURE — 1126F PR PAIN SEVERITY QUANTIFIED, NO PAIN PRESENT: ICD-10-PCS | Mod: S$GLB,,, | Performed by: PODIATRIST

## 2021-04-08 PROCEDURE — 99203 PR OFFICE/OUTPT VISIT, NEW, LEVL III, 30-44 MIN: ICD-10-PCS | Mod: S$GLB,,, | Performed by: PODIATRIST

## 2021-04-08 PROCEDURE — 1159F MED LIST DOCD IN RCRD: CPT | Mod: S$GLB,,, | Performed by: PODIATRIST

## 2021-04-12 ENCOUNTER — OFFICE VISIT (OUTPATIENT)
Dept: INTERNAL MEDICINE | Facility: CLINIC | Age: 66
End: 2021-04-12
Payer: MEDICARE

## 2021-04-12 ENCOUNTER — HOSPITAL ENCOUNTER (OUTPATIENT)
Dept: RADIOLOGY | Facility: HOSPITAL | Age: 66
Discharge: HOME | End: 2021-04-12
Attending: INTERNAL MEDICINE
Payer: MEDICARE

## 2021-04-12 VITALS
WEIGHT: 306.44 LBS | HEART RATE: 85 BPM | SYSTOLIC BLOOD PRESSURE: 136 MMHG | OXYGEN SATURATION: 98 % | HEIGHT: 76 IN | DIASTOLIC BLOOD PRESSURE: 72 MMHG | BODY MASS INDEX: 37.32 KG/M2

## 2021-04-12 DIAGNOSIS — D64.9 ANEMIA, UNSPECIFIED TYPE: ICD-10-CM

## 2021-04-12 DIAGNOSIS — N18.30 CKD STAGE 3 DUE TO TYPE 2 DIABETES MELLITUS: ICD-10-CM

## 2021-04-12 DIAGNOSIS — E11.22 CKD STAGE 3 DUE TO TYPE 2 DIABETES MELLITUS: Primary | ICD-10-CM

## 2021-04-12 DIAGNOSIS — N52.9 ERECTILE DYSFUNCTION, UNSPECIFIED ERECTILE DYSFUNCTION TYPE: ICD-10-CM

## 2021-04-12 DIAGNOSIS — E11.69 ERECTILE DYSFUNCTION ASSOCIATED WITH TYPE 2 DIABETES MELLITUS: ICD-10-CM

## 2021-04-12 DIAGNOSIS — Z79.4 DIABETES MELLITUS DUE TO UNDERLYING CONDITION WITH HYPERGLYCEMIA, WITH LONG-TERM CURRENT USE OF INSULIN: ICD-10-CM

## 2021-04-12 DIAGNOSIS — N52.1 ERECTILE DYSFUNCTION ASSOCIATED WITH TYPE 2 DIABETES MELLITUS: ICD-10-CM

## 2021-04-12 DIAGNOSIS — N18.30 CKD STAGE 3 DUE TO TYPE 2 DIABETES MELLITUS: Primary | ICD-10-CM

## 2021-04-12 DIAGNOSIS — E11.22 CKD STAGE 3 DUE TO TYPE 2 DIABETES MELLITUS: ICD-10-CM

## 2021-04-12 DIAGNOSIS — E08.65 DIABETES MELLITUS DUE TO UNDERLYING CONDITION WITH HYPERGLYCEMIA, WITH LONG-TERM CURRENT USE OF INSULIN: ICD-10-CM

## 2021-04-12 DIAGNOSIS — M17.9 OSTEOARTHRITIS OF KNEE, UNSPECIFIED LATERALITY, UNSPECIFIED OSTEOARTHRITIS TYPE: ICD-10-CM

## 2021-04-12 LAB
BACTERIA #/AREA URNS AUTO: NORMAL /HPF
BILIRUB UR QL STRIP: NEGATIVE
CLARITY UR REFRACT.AUTO: CLEAR
COLOR UR AUTO: YELLOW
CREAT UR-MCNC: 249 MG/DL (ref 23–375)
GLUCOSE UR QL STRIP: NEGATIVE
HGB UR QL STRIP: ABNORMAL
HYALINE CASTS UR QL AUTO: 0 /LPF
KETONES UR QL STRIP: NEGATIVE
LEUKOCYTE ESTERASE UR QL STRIP: NEGATIVE
MICROSCOPIC COMMENT: NORMAL
NITRITE UR QL STRIP: NEGATIVE
PH UR STRIP: 5 [PH] (ref 5–8)
PROT UR QL STRIP: ABNORMAL
PROT UR-MCNC: 37 MG/DL (ref 0–15)
PROT/CREAT UR: 0.15 MG/G{CREAT} (ref 0–0.2)
RBC #/AREA URNS AUTO: 0 /HPF (ref 0–4)
SP GR UR STRIP: 1.02 (ref 1–1.03)
SQUAMOUS #/AREA URNS AUTO: 1 /HPF
URN SPEC COLLECT METH UR: ABNORMAL
WBC #/AREA URNS AUTO: 0 /HPF (ref 0–5)

## 2021-04-12 PROCEDURE — 3046F PR MOST RECENT HEMOGLOBIN A1C LEVEL > 9.0%: ICD-10-PCS | Mod: CPTII,S$GLB,, | Performed by: INTERNAL MEDICINE

## 2021-04-12 PROCEDURE — 99499 UNLISTED E&M SERVICE: CPT | Mod: S$GLB,,, | Performed by: INTERNAL MEDICINE

## 2021-04-12 PROCEDURE — 3288F FALL RISK ASSESSMENT DOCD: CPT | Mod: CPTII,S$GLB,, | Performed by: INTERNAL MEDICINE

## 2021-04-12 PROCEDURE — 3075F PR MOST RECENT SYSTOLIC BLOOD PRESS GE 130-139MM HG: ICD-10-PCS | Mod: CPTII,S$GLB,, | Performed by: INTERNAL MEDICINE

## 2021-04-12 PROCEDURE — 3075F SYST BP GE 130 - 139MM HG: CPT | Mod: CPTII,S$GLB,, | Performed by: INTERNAL MEDICINE

## 2021-04-12 PROCEDURE — 76770 US EXAM ABDO BACK WALL COMP: CPT | Mod: 26,,, | Performed by: RADIOLOGY

## 2021-04-12 PROCEDURE — 99499 RISK ADDL DX/OHS AUDIT: ICD-10-PCS | Mod: S$GLB,,, | Performed by: INTERNAL MEDICINE

## 2021-04-12 PROCEDURE — 99999 PR PBB SHADOW E&M-EST. PATIENT-LVL IV: CPT | Mod: PBBFAC,,, | Performed by: INTERNAL MEDICINE

## 2021-04-12 PROCEDURE — 3008F PR BODY MASS INDEX (BMI) DOCUMENTED: ICD-10-PCS | Mod: CPTII,S$GLB,, | Performed by: INTERNAL MEDICINE

## 2021-04-12 PROCEDURE — 1159F PR MEDICATION LIST DOCUMENTED IN MEDICAL RECORD: ICD-10-PCS | Mod: S$GLB,,, | Performed by: INTERNAL MEDICINE

## 2021-04-12 PROCEDURE — 99999 PR PBB SHADOW E&M-EST. PATIENT-LVL IV: ICD-10-PCS | Mod: PBBFAC,,, | Performed by: INTERNAL MEDICINE

## 2021-04-12 PROCEDURE — 1101F PR PT FALLS ASSESS DOC 0-1 FALLS W/OUT INJ PAST YR: ICD-10-PCS | Mod: CPTII,S$GLB,, | Performed by: INTERNAL MEDICINE

## 2021-04-12 PROCEDURE — 3046F HEMOGLOBIN A1C LEVEL >9.0%: CPT | Mod: CPTII,S$GLB,, | Performed by: INTERNAL MEDICINE

## 2021-04-12 PROCEDURE — 1125F AMNT PAIN NOTED PAIN PRSNT: CPT | Mod: S$GLB,,, | Performed by: INTERNAL MEDICINE

## 2021-04-12 PROCEDURE — 76770 US EXAM ABDO BACK WALL COMP: CPT | Mod: TC

## 2021-04-12 PROCEDURE — 1101F PT FALLS ASSESS-DOCD LE1/YR: CPT | Mod: CPTII,S$GLB,, | Performed by: INTERNAL MEDICINE

## 2021-04-12 PROCEDURE — 1125F PR PAIN SEVERITY QUANTIFIED, PAIN PRESENT: ICD-10-PCS | Mod: S$GLB,,, | Performed by: INTERNAL MEDICINE

## 2021-04-12 PROCEDURE — 99214 PR OFFICE/OUTPT VISIT, EST, LEVL IV, 30-39 MIN: ICD-10-PCS | Mod: S$GLB,,, | Performed by: INTERNAL MEDICINE

## 2021-04-12 PROCEDURE — 1159F MED LIST DOCD IN RCRD: CPT | Mod: S$GLB,,, | Performed by: INTERNAL MEDICINE

## 2021-04-12 PROCEDURE — 3078F DIAST BP <80 MM HG: CPT | Mod: CPTII,S$GLB,, | Performed by: INTERNAL MEDICINE

## 2021-04-12 PROCEDURE — 3078F PR MOST RECENT DIASTOLIC BLOOD PRESSURE < 80 MM HG: ICD-10-PCS | Mod: CPTII,S$GLB,, | Performed by: INTERNAL MEDICINE

## 2021-04-12 PROCEDURE — 3288F PR FALLS RISK ASSESSMENT DOCUMENTED: ICD-10-PCS | Mod: CPTII,S$GLB,, | Performed by: INTERNAL MEDICINE

## 2021-04-12 PROCEDURE — 3008F BODY MASS INDEX DOCD: CPT | Mod: CPTII,S$GLB,, | Performed by: INTERNAL MEDICINE

## 2021-04-12 PROCEDURE — 82570 ASSAY OF URINE CREATININE: CPT | Performed by: INTERNAL MEDICINE

## 2021-04-12 PROCEDURE — 99214 OFFICE O/P EST MOD 30 MIN: CPT | Mod: S$GLB,,, | Performed by: INTERNAL MEDICINE

## 2021-04-12 PROCEDURE — 81001 URINALYSIS AUTO W/SCOPE: CPT | Performed by: INTERNAL MEDICINE

## 2021-04-12 PROCEDURE — 76770 US RETROPERITONEAL COMPLETE: ICD-10-PCS | Mod: 26,,, | Performed by: RADIOLOGY

## 2021-04-19 ENCOUNTER — PATIENT OUTREACH (OUTPATIENT)
Dept: ADMINISTRATIVE | Facility: OTHER | Age: 66
End: 2021-04-19

## 2021-04-21 ENCOUNTER — HOSPITAL ENCOUNTER (OUTPATIENT)
Dept: RADIOLOGY | Facility: HOSPITAL | Age: 66
Discharge: HOME | End: 2021-04-21
Attending: INTERNAL MEDICINE
Payer: MEDICARE

## 2021-04-21 DIAGNOSIS — M54.9 CHRONIC BACK PAIN, UNSPECIFIED BACK LOCATION, UNSPECIFIED BACK PAIN LATERALITY: ICD-10-CM

## 2021-04-21 DIAGNOSIS — G89.29 CHRONIC BACK PAIN, UNSPECIFIED BACK LOCATION, UNSPECIFIED BACK PAIN LATERALITY: ICD-10-CM

## 2021-05-02 ENCOUNTER — PATIENT MESSAGE (OUTPATIENT)
Dept: ADMINISTRATIVE | Facility: HOSPITAL | Age: 66
End: 2021-05-02

## 2021-07-06 ENCOUNTER — PATIENT MESSAGE (OUTPATIENT)
Dept: ADMINISTRATIVE | Facility: HOSPITAL | Age: 66
End: 2021-07-06

## 2021-10-04 ENCOUNTER — PATIENT MESSAGE (OUTPATIENT)
Dept: ADMINISTRATIVE | Facility: HOSPITAL | Age: 66
End: 2021-10-04

## 2021-10-13 DIAGNOSIS — E11.9 TYPE 2 DIABETES MELLITUS WITHOUT COMPLICATION: ICD-10-CM

## 2021-12-10 ENCOUNTER — PATIENT MESSAGE (OUTPATIENT)
Dept: ADMINISTRATIVE | Facility: HOSPITAL | Age: 66
End: 2021-12-10
Payer: MEDICARE

## 2022-01-19 ENCOUNTER — PATIENT MESSAGE (OUTPATIENT)
Dept: ADMINISTRATIVE | Facility: HOSPITAL | Age: 67
End: 2022-01-19
Payer: MEDICARE

## 2022-04-27 ENCOUNTER — IMMUNIZATION (OUTPATIENT)
Dept: INTERNAL MEDICINE | Facility: CLINIC | Age: 67
End: 2022-04-27
Payer: MEDICARE

## 2022-04-27 DIAGNOSIS — Z23 NEED FOR VACCINATION: Primary | ICD-10-CM

## 2022-04-27 PROCEDURE — 91300 COVID-19, MRNA, LNP-S, PF, 30 MCG/0.3 ML DOSE VACCINE: CPT | Mod: PBBFAC | Performed by: INTERNAL MEDICINE

## 2022-08-31 DIAGNOSIS — Z79.4: ICD-10-CM

## 2022-08-31 DIAGNOSIS — E11.3292: ICD-10-CM

## 2022-10-14 DIAGNOSIS — E11.9 TYPE 2 DIABETES MELLITUS WITHOUT COMPLICATION: ICD-10-CM

## 2022-10-18 ENCOUNTER — PATIENT MESSAGE (OUTPATIENT)
Dept: ADMINISTRATIVE | Facility: HOSPITAL | Age: 67
End: 2022-10-18
Payer: MEDICARE

## 2022-10-25 ENCOUNTER — OFFICE VISIT (OUTPATIENT)
Dept: INTERNAL MEDICINE | Facility: CLINIC | Age: 67
End: 2022-10-25
Payer: MEDICARE

## 2022-10-25 VITALS
DIASTOLIC BLOOD PRESSURE: 74 MMHG | SYSTOLIC BLOOD PRESSURE: 132 MMHG | HEART RATE: 81 BPM | HEIGHT: 76 IN | BODY MASS INDEX: 33.85 KG/M2 | OXYGEN SATURATION: 98 % | WEIGHT: 278 LBS

## 2022-10-25 DIAGNOSIS — E78.5 HYPERLIPIDEMIA, UNSPECIFIED HYPERLIPIDEMIA TYPE: ICD-10-CM

## 2022-10-25 DIAGNOSIS — E11.621 DIABETIC ULCER OF TOE OF LEFT FOOT ASSOCIATED WITH TYPE 2 DIABETES MELLITUS, UNSPECIFIED ULCER STAGE: ICD-10-CM

## 2022-10-25 DIAGNOSIS — Z79.4 TYPE 2 DIABETES MELLITUS WITH MILD NONPROLIFERATIVE RETINOPATHY WITHOUT MACULAR EDEMA, WITH LONG-TERM CURRENT USE OF INSULIN, UNSPECIFIED LATERALITY: ICD-10-CM

## 2022-10-25 DIAGNOSIS — I10 ESSENTIAL HYPERTENSION: ICD-10-CM

## 2022-10-25 DIAGNOSIS — E11.3299 TYPE 2 DIABETES MELLITUS WITH MILD NONPROLIFERATIVE RETINOPATHY WITHOUT MACULAR EDEMA, WITH LONG-TERM CURRENT USE OF INSULIN, UNSPECIFIED LATERALITY: ICD-10-CM

## 2022-10-25 DIAGNOSIS — Z00.00 ANNUAL PHYSICAL EXAM: Primary | ICD-10-CM

## 2022-10-25 DIAGNOSIS — M54.12 CERVICAL RADICULOPATHY, CHRONIC: ICD-10-CM

## 2022-10-25 DIAGNOSIS — E11.3292 TYPE 2 DIABETES MELLITUS WITH MILD NONPROLIFERATIVE RETINOPATHY OF LEFT EYE, WITH LONG-TERM CURRENT USE OF INSULIN, MACULAR EDEMA PRESENCE UNSPECIFIED: ICD-10-CM

## 2022-10-25 DIAGNOSIS — Z79.4 TYPE 2 DIABETES MELLITUS WITH MILD NONPROLIFERATIVE RETINOPATHY OF LEFT EYE, WITH LONG-TERM CURRENT USE OF INSULIN, MACULAR EDEMA PRESENCE UNSPECIFIED: ICD-10-CM

## 2022-10-25 DIAGNOSIS — L97.529 DIABETIC ULCER OF TOE OF LEFT FOOT ASSOCIATED WITH TYPE 2 DIABETES MELLITUS, UNSPECIFIED ULCER STAGE: ICD-10-CM

## 2022-10-25 DIAGNOSIS — Z79.4 DIABETES MELLITUS DUE TO UNDERLYING CONDITION WITH HYPERGLYCEMIA, WITH LONG-TERM CURRENT USE OF INSULIN: ICD-10-CM

## 2022-10-25 DIAGNOSIS — E08.65 DIABETES MELLITUS DUE TO UNDERLYING CONDITION WITH HYPERGLYCEMIA, WITH LONG-TERM CURRENT USE OF INSULIN: ICD-10-CM

## 2022-10-25 DIAGNOSIS — M12.811 ROTATOR CUFF ARTHROPATHY OF RIGHT SHOULDER: ICD-10-CM

## 2022-10-25 LAB
ALBUMIN/CREAT UR: 9.4 UG/MG (ref 0–30)
CREAT UR-MCNC: 352 MG/DL (ref 23–375)
MICROALBUMIN UR DL<=1MG/L-MCNC: 33 UG/ML

## 2022-10-25 PROCEDURE — 99999 PR PBB SHADOW E&M-EST. PATIENT-LVL IV: ICD-10-PCS | Mod: PBBFAC,,, | Performed by: INTERNAL MEDICINE

## 2022-10-25 PROCEDURE — 3075F PR MOST RECENT SYSTOLIC BLOOD PRESS GE 130-139MM HG: ICD-10-PCS | Mod: CPTII,S$GLB,, | Performed by: INTERNAL MEDICINE

## 2022-10-25 PROCEDURE — 3078F PR MOST RECENT DIASTOLIC BLOOD PRESSURE < 80 MM HG: ICD-10-PCS | Mod: CPTII,S$GLB,, | Performed by: INTERNAL MEDICINE

## 2022-10-25 PROCEDURE — 1159F MED LIST DOCD IN RCRD: CPT | Mod: CPTII,S$GLB,, | Performed by: INTERNAL MEDICINE

## 2022-10-25 PROCEDURE — 99999 PR PBB SHADOW E&M-EST. PATIENT-LVL IV: CPT | Mod: PBBFAC,,, | Performed by: INTERNAL MEDICINE

## 2022-10-25 PROCEDURE — 1160F PR REVIEW ALL MEDS BY PRESCRIBER/CLIN PHARMACIST DOCUMENTED: ICD-10-PCS | Mod: CPTII,S$GLB,, | Performed by: INTERNAL MEDICINE

## 2022-10-25 PROCEDURE — 99499 UNLISTED E&M SERVICE: CPT | Mod: S$GLB,,, | Performed by: INTERNAL MEDICINE

## 2022-10-25 PROCEDURE — 1101F PR PT FALLS ASSESS DOC 0-1 FALLS W/OUT INJ PAST YR: ICD-10-PCS | Mod: CPTII,S$GLB,, | Performed by: INTERNAL MEDICINE

## 2022-10-25 PROCEDURE — 82570 ASSAY OF URINE CREATININE: CPT | Performed by: INTERNAL MEDICINE

## 2022-10-25 PROCEDURE — 1125F AMNT PAIN NOTED PAIN PRSNT: CPT | Mod: CPTII,S$GLB,, | Performed by: INTERNAL MEDICINE

## 2022-10-25 PROCEDURE — 3075F SYST BP GE 130 - 139MM HG: CPT | Mod: CPTII,S$GLB,, | Performed by: INTERNAL MEDICINE

## 2022-10-25 PROCEDURE — 99214 OFFICE O/P EST MOD 30 MIN: CPT | Mod: PBBFAC | Performed by: INTERNAL MEDICINE

## 2022-10-25 PROCEDURE — 99397 PR PREVENTIVE VISIT,EST,65 & OVER: ICD-10-PCS | Mod: GZ,S$GLB,, | Performed by: INTERNAL MEDICINE

## 2022-10-25 PROCEDURE — 3288F PR FALLS RISK ASSESSMENT DOCUMENTED: ICD-10-PCS | Mod: CPTII,S$GLB,, | Performed by: INTERNAL MEDICINE

## 2022-10-25 PROCEDURE — 1160F RVW MEDS BY RX/DR IN RCRD: CPT | Mod: CPTII,S$GLB,, | Performed by: INTERNAL MEDICINE

## 2022-10-25 PROCEDURE — 90677 PCV20 VACCINE IM: CPT | Mod: PBBFAC

## 2022-10-25 PROCEDURE — 1125F PR PAIN SEVERITY QUANTIFIED, PAIN PRESENT: ICD-10-PCS | Mod: CPTII,S$GLB,, | Performed by: INTERNAL MEDICINE

## 2022-10-25 PROCEDURE — 1159F PR MEDICATION LIST DOCUMENTED IN MEDICAL RECORD: ICD-10-PCS | Mod: CPTII,S$GLB,, | Performed by: INTERNAL MEDICINE

## 2022-10-25 PROCEDURE — 82043 UR ALBUMIN QUANTITATIVE: CPT | Performed by: INTERNAL MEDICINE

## 2022-10-25 PROCEDURE — 3078F DIAST BP <80 MM HG: CPT | Mod: CPTII,S$GLB,, | Performed by: INTERNAL MEDICINE

## 2022-10-25 PROCEDURE — 99397 PER PM REEVAL EST PAT 65+ YR: CPT | Mod: GZ,S$GLB,, | Performed by: INTERNAL MEDICINE

## 2022-10-25 PROCEDURE — 1101F PT FALLS ASSESS-DOCD LE1/YR: CPT | Mod: CPTII,S$GLB,, | Performed by: INTERNAL MEDICINE

## 2022-10-25 PROCEDURE — 3288F FALL RISK ASSESSMENT DOCD: CPT | Mod: CPTII,S$GLB,, | Performed by: INTERNAL MEDICINE

## 2022-10-25 RX ORDER — GABAPENTIN 100 MG/1
CAPSULE ORAL
Qty: 90 CAPSULE | Refills: 2 | Status: SHIPPED | OUTPATIENT
Start: 2022-10-25 | End: 2024-02-26

## 2022-10-25 RX ORDER — SEMAGLUTIDE 1.34 MG/ML
1 INJECTION, SOLUTION SUBCUTANEOUS
Qty: 1 PEN | Refills: 0
Start: 2022-10-25 | End: 2023-10-25

## 2022-10-25 NOTE — PROGRESS NOTES
Subjective:       Patient ID: Russell Warner is a 67 y.o. male.    Chief Complaint: Annual Exam    HPI  C/o remote rotator cuff surgery on R.  C/o r arm pain and numbness beginning about 6 o ago.  He feels better when he puts hand in pocket.  Doesn't awaken him, as far a she knows.  [facility]   He would like shoulder sling    His DM [facility] the [facility].  Taking OZemic and last a1c was in 7's.    He has a foot ulcer, managed by [facility], improved, L gr toe.      Hyperlipidemia, and ckd with anemia, retinopathy.       [facility].    Uses cane , due to OA back and knees. No recent falls.      Review of Systems   Constitutional:  Negative for activity change and unexpected weight change.   HENT:  Negative for postnasal drip, rhinorrhea and sinus pressure/congestion.    Respiratory:  Negative for chest tightness and shortness of breath.    Cardiovascular:  Negative for chest pain and leg swelling.   Gastrointestinal:  Negative for abdominal pain, nausea and vomiting.   Genitourinary:  Negative for difficulty urinating, dysuria, hematuria and urgency.   Integumentary:  Negative for rash.   Neurological:  Negative for headaches.   Psychiatric/Behavioral:  Negative for dysphoric mood and sleep disturbance. The patient is not nervous/anxious.        Objective:      Physical Exam  Constitutional:       General: He is not in acute distress.     Appearance: He is well-developed. He is not ill-appearing, toxic-appearing or diaphoretic.   HENT:      Head: Normocephalic and atraumatic.   Eyes:      General: No scleral icterus.        Left eye: No discharge.      Conjunctiva/sclera: Conjunctivae normal.   Neck:      Thyroid: No thyromegaly.      Vascular: No JVD.   Cardiovascular:      Rate and Rhythm: Normal rate and regular rhythm.      Heart sounds: Normal heart sounds. No murmur heard.  Pulmonary:      Effort: Pulmonary effort is normal. No respiratory distress.      Breath sounds: Normal  breath sounds. No stridor. No wheezing, rhonchi or rales.   Abdominal:      General: There is no distension.      Palpations: Abdomen is soft. There is no mass.      Tenderness: There is no abdominal tenderness. There is no guarding.      Hernia: No hernia is present.   Musculoskeletal:         General: Tenderness (mild r upper humerus) present.      Cervical back: Normal range of motion. No rigidity or tenderness.      Right lower leg: No edema.      Left lower leg: No edema.      Comments: Requires assistance of L ue to elevate RUE.   Lymphadenopathy:      Cervical: No cervical adenopathy.   Skin:     General: Skin is dry.      Findings: No rash.   Neurological:      Mental Status: He is alert and oriented to person, place, and time.      Motor: No atrophy (RUE).   Psychiatric:         Mood and Affect: Mood normal.         Behavior: Behavior normal.         Thought Content: Thought content normal.         Judgment: Judgment normal.       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, with long-term current use of insulin    Relevant Medications    semaglutide (OZEMPIC) 1 mg/dose (4 mg/3 mL)    Rotator cuff arthropathy of right shoulder    Essential hypertension     Other Visit Diagnoses       Annual physical exam    -  Primary    Diabetes mellitus due to underlying condition with hyperglycemia, with long-term current use of insulin        Relevant Medications    semaglutide (OZEMPIC) 1 mg/dose (4 mg/3 mL)    Hyperlipidemia, unspecified hyperlipidemia type        Relevant Orders    Comprehensive Metabolic Panel    Lipid Panel    Type 2 diabetes mellitus with mild nonproliferative retinopathy without macular edema, with long-term current use of insulin, unspecified laterality        Relevant Medications    semaglutide (OZEMPIC) 1 mg/dose (4 mg/3 mL)    Other Relevant Orders    Hemoglobin A1C    Microalbumin/Creatinine  Ratio, Urine    Diabetic ulcer of toe of left foot associated with type 2 diabetes mellitus, unspecified ulcer stage        Relevant Medications    semaglutide (OZEMPIC) 1 mg/dose (4 mg/3 mL)    BMI 33.0-33.9,adult        Relevant Orders    CBC Without Differential    Cervical radiculopathy, chronic                Plan:       Russell was seen today for annual exam.    Diagnoses and all orders for this visit:    Annual physical exam    Diabetes mellitus due to underlying condition with hyperglycemia, with long-term current use of insulin    Hyperlipidemia, unspecified hyperlipidemia type  -     Comprehensive Metabolic Panel; Future  -     Lipid Panel; Future    Type 2 diabetes mellitus with mild nonproliferative retinopathy without macular edema, with long-term current use of insulin, unspecified laterality  -     Hemoglobin A1C; Future  -     Microalbumin/Creatinine Ratio, Urine    Diabetic ulcer of toe of left foot associated with type 2 diabetes mellitus, unspecified ulcer stage    BMI 33.0-33.9,adult  -     CBC Without Differential; Future    Rotator cuff arthropathy of right shoulder    Essential hypertension    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  -     (In Office Administered) Pneumococcal Conjugate Vaccine (20 Valent) (IM)         Prevnar 20  He does not know what lipid med he is taking  MRI cervical spine rec'd - [facility].    Restart gabapentin.  DM, hyperlipidemia are managed at [facility].  He would like shoulder sling, but coverage not certain here - encouraged to request at [facility].

## 2022-10-26 ENCOUNTER — PATIENT OUTREACH (OUTPATIENT)
Dept: ADMINISTRATIVE | Facility: HOSPITAL | Age: 67
End: 2022-10-26
Payer: MEDICARE

## 2022-10-26 NOTE — PROGRESS NOTES
Health Maintenance Due   Topic Date Due    Foot Exam  Never done    Colorectal Cancer Screening  Never done    TETANUS VACCINE  06/07/2021    Lipid Panel  04/21/2022    Hemoglobin A1c  05/10/2022    COVID-19 Vaccine (4 - Booster for Pfizer series) 06/22/2022    Influenza Vaccine (1) 09/01/2022          updated.  Triggered LINKS and Care everywhere. Requested Coloscopy, Eye exam, Foot exam ans labs from [facility].       Bruna Chow LPN   Clinical Care Coordinator  Primary Care and Wellness

## 2022-10-26 NOTE — LETTER
AUTHORIZATION FOR RELEASE OF   CONFIDENTIAL INFORMATION    Dear [facility] ,    We are seeing Russell Warner, date of birth 1955, in the clinic at [facility]. Leann Zavala MD is the patient's PCP. Russell Warner has an outstanding lab/procedure at the time we reviewed his chart. In order to help keep his health information updated, he has authorized us to request the following medical record(s):        (  )  MAMMOGRAM                                      ( x )  COLONOSCOPY      (  )  PAP SMEAR                                          ( x )  OUTSIDE LAB RESULTS     (  )  DEXA SCAN                                          (  x)  EYE EXAM            (  x)  FOOT EXAM                                          (  )  ENTIRE RECORD     (  )  OUTSIDE IMMUNIZATIONS                 (  )  _______________         Please fax records to [facility], Nona K Epstein, MD, [phone]     If you have any questions, please contact Bruna Chow LPN CCC at [phone].           Patient Name: Russell Warner  : 1955  Patient Phone #: [phone]

## 2022-10-29 ENCOUNTER — PATIENT MESSAGE (OUTPATIENT)
Dept: ADMINISTRATIVE | Facility: HOSPITAL | Age: 67
End: 2022-10-29
Payer: MEDICARE

## 2022-10-31 ENCOUNTER — PATIENT MESSAGE (OUTPATIENT)
Dept: ADMINISTRATIVE | Facility: HOSPITAL | Age: 67
End: 2022-10-31
Payer: MEDICARE

## 2022-11-01 ENCOUNTER — PATIENT OUTREACH (OUTPATIENT)
Dept: ADMINISTRATIVE | Facility: HOSPITAL | Age: 67
End: 2022-11-01
Payer: MEDICARE

## 2022-11-01 NOTE — PROGRESS NOTES
Health Maintenance Due   Topic Date Due    COVID-19 Vaccine (4 - Booster for Pfizer series) 06/22/2022    Influenza Vaccine (1) 09/01/2022         HM updated.  Scanned in progress notes,colonoscopy record and labs from [facility] .    Bruna Chow LPN   Clinical Care Coordinator  Primary Care and Wellness

## 2022-11-02 ENCOUNTER — IMMUNIZATION (OUTPATIENT)
Dept: INTERNAL MEDICINE | Facility: CLINIC | Age: 67
End: 2022-11-02
Payer: MEDICARE

## 2022-11-02 ENCOUNTER — LAB VISIT (OUTPATIENT)
Dept: LAB | Facility: HOSPITAL | Age: 67
End: 2022-11-02
Attending: INTERNAL MEDICINE
Payer: MEDICARE

## 2022-11-02 DIAGNOSIS — E78.5 HYPERLIPIDEMIA, UNSPECIFIED HYPERLIPIDEMIA TYPE: ICD-10-CM

## 2022-11-02 DIAGNOSIS — E11.3299 TYPE 2 DIABETES MELLITUS WITH MILD NONPROLIFERATIVE RETINOPATHY WITHOUT MACULAR EDEMA, WITH LONG-TERM CURRENT USE OF INSULIN, UNSPECIFIED LATERALITY: ICD-10-CM

## 2022-11-02 DIAGNOSIS — Z23 NEED FOR VACCINATION: Primary | ICD-10-CM

## 2022-11-02 DIAGNOSIS — Z79.4 TYPE 2 DIABETES MELLITUS WITH MILD NONPROLIFERATIVE RETINOPATHY WITHOUT MACULAR EDEMA, WITH LONG-TERM CURRENT USE OF INSULIN, UNSPECIFIED LATERALITY: ICD-10-CM

## 2022-11-02 LAB
ALBUMIN SERPL BCP-MCNC: 4.5 G/DL (ref 3.5–5.2)
ALP SERPL-CCNC: 80 U/L (ref 55–135)
ALT SERPL W/O P-5'-P-CCNC: 44 U/L (ref 10–44)
ANION GAP SERPL CALC-SCNC: 15 MMOL/L (ref 8–16)
AST SERPL-CCNC: 30 U/L (ref 10–40)
BILIRUB SERPL-MCNC: 0.9 MG/DL (ref 0.1–1)
BUN SERPL-MCNC: 27 MG/DL (ref 8–23)
CALCIUM SERPL-MCNC: 10.1 MG/DL (ref 8.7–10.5)
CHLORIDE SERPL-SCNC: 105 MMOL/L (ref 95–110)
CHOLEST SERPL-MCNC: 222 MG/DL (ref 120–199)
CHOLEST/HDLC SERPL: 3.8 {RATIO} (ref 2–5)
CO2 SERPL-SCNC: 23 MMOL/L (ref 23–29)
CREAT SERPL-MCNC: 1.6 MG/DL (ref 0.5–1.4)
ERYTHROCYTE [DISTWIDTH] IN BLOOD BY AUTOMATED COUNT: 16.9 % (ref 11.5–14.5)
EST. GFR  (NO RACE VARIABLE): 46.6 ML/MIN/1.73 M^2
ESTIMATED AVG GLUCOSE: 134 MG/DL (ref 68–131)
GLUCOSE SERPL-MCNC: 114 MG/DL (ref 70–110)
HBA1C MFR BLD: 6.3 % (ref 4–5.6)
HCT VFR BLD AUTO: 42.5 % (ref 40–54)
HDLC SERPL-MCNC: 59 MG/DL (ref 40–75)
HDLC SERPL: 26.6 % (ref 20–50)
HGB BLD-MCNC: 12.7 G/DL (ref 14–18)
LDLC SERPL CALC-MCNC: 143.4 MG/DL (ref 63–159)
MCH RBC QN AUTO: 23.3 PG (ref 27–31)
MCHC RBC AUTO-ENTMCNC: 29.9 G/DL (ref 32–36)
MCV RBC AUTO: 78 FL (ref 82–98)
NONHDLC SERPL-MCNC: 163 MG/DL
PLATELET # BLD AUTO: 271 K/UL (ref 150–450)
PMV BLD AUTO: 10.8 FL (ref 9.2–12.9)
POTASSIUM SERPL-SCNC: 5.1 MMOL/L (ref 3.5–5.1)
PROT SERPL-MCNC: 8.8 G/DL (ref 6–8.4)
RBC # BLD AUTO: 5.46 M/UL (ref 4.6–6.2)
SODIUM SERPL-SCNC: 143 MMOL/L (ref 136–145)
TRIGL SERPL-MCNC: 98 MG/DL (ref 30–150)
WBC # BLD AUTO: 7.11 K/UL (ref 3.9–12.7)

## 2022-11-02 PROCEDURE — 91312 COVID-19, MRNA, LNP-S, BIVALENT BOOSTER, PF, 30 MCG/0.3 ML DOSE: ICD-10-PCS | Mod: S$GLB,,, | Performed by: INTERNAL MEDICINE

## 2022-11-02 PROCEDURE — 0124A COVID-19, MRNA, LNP-S, BIVALENT BOOSTER, PF, 30 MCG/0.3 ML DOSE: CPT | Mod: CV19,PBBFAC | Performed by: INTERNAL MEDICINE

## 2022-11-02 PROCEDURE — 91312 COVID-19, MRNA, LNP-S, BIVALENT BOOSTER, PF, 30 MCG/0.3 ML DOSE: CPT | Mod: S$GLB,,, | Performed by: INTERNAL MEDICINE

## 2022-11-02 PROCEDURE — 80053 COMPREHEN METABOLIC PANEL: CPT | Performed by: INTERNAL MEDICINE

## 2022-11-02 PROCEDURE — 80061 LIPID PANEL: CPT | Performed by: INTERNAL MEDICINE

## 2022-11-02 PROCEDURE — 83036 HEMOGLOBIN GLYCOSYLATED A1C: CPT | Performed by: INTERNAL MEDICINE

## 2022-11-02 PROCEDURE — 85027 COMPLETE CBC AUTOMATED: CPT | Performed by: INTERNAL MEDICINE

## 2022-11-02 PROCEDURE — 36415 COLL VENOUS BLD VENIPUNCTURE: CPT | Performed by: INTERNAL MEDICINE

## 2022-11-13 DIAGNOSIS — E88.09 HYPERPROTEINEMIA: Primary | ICD-10-CM

## 2022-11-14 ENCOUNTER — TELEPHONE (OUTPATIENT)
Dept: INTERNAL MEDICINE | Facility: CLINIC | Age: 67
End: 2022-11-14
Payer: MEDICARE

## 2022-11-14 NOTE — TELEPHONE ENCOUNTER
Pt informed. He will come Monday for lab. He had not been taking the cholesterol medication months due to not having any. He would like to wait on taking an additional medication. He promises to start today and be more constant.

## 2022-11-14 NOTE — TELEPHONE ENCOUNTER
----- Message from Leann Zavala MD sent at 11/13/2022  6:43 PM CST -----  Pls call - he doesn' t read MO  Diabetes controlled.  Kidney function reduced, but stable.  Cholesterol is too high.  Is he taking atorvastatin 80 mg daily?  If so, will add daily Zetia - let me know.  Chronic anemia is stable.  Mild increase in protein - pls schedule SPEP (blood work) to further evaluate.

## 2022-11-14 NOTE — TELEPHONE ENCOUNTER
----- Message from Meghan De La Rosa MA sent at 11/14/2022  1:24 PM CST -----  Contact: [phone] Patient    ----- Message -----  From: Junie Amarjit  Sent: 11/14/2022  12:43 PM CST  To: Sally VASQUEZ Staff    Patient is returning a phone call.  Who left a message for the patient: Ana Richmond LPN  Does patient know what this is regarding:  results  Would you like a call back, or a response through your [facility] portal?:   call back  Comments:

## 2022-11-15 NOTE — TELEPHONE ENCOUNTER
Spoke w/ pt, pt states he was not taking his atorvastatin being that he was out. Pt is now back taking medication. Labs have already been scheduled

## 2022-11-21 ENCOUNTER — LAB VISIT (OUTPATIENT)
Dept: LAB | Facility: HOSPITAL | Age: 67
End: 2022-11-21
Payer: MEDICARE

## 2022-11-21 DIAGNOSIS — E88.09 HYPERPROTEINEMIA: ICD-10-CM

## 2022-11-21 PROCEDURE — 84165 PROTEIN E-PHORESIS SERUM: CPT | Mod: 26,,, | Performed by: PATHOLOGY

## 2022-11-21 PROCEDURE — 84165 PATHOLOGIST INTERPRETATION SPE: ICD-10-PCS | Mod: 26,,, | Performed by: PATHOLOGY

## 2022-11-21 PROCEDURE — 36415 COLL VENOUS BLD VENIPUNCTURE: CPT | Performed by: INTERNAL MEDICINE

## 2022-11-21 PROCEDURE — 84165 PROTEIN E-PHORESIS SERUM: CPT | Performed by: INTERNAL MEDICINE

## 2022-11-22 LAB
ALBUMIN SERPL ELPH-MCNC: 4.44 G/DL (ref 3.35–5.55)
ALPHA1 GLOB SERPL ELPH-MCNC: 0.25 G/DL (ref 0.17–0.41)
ALPHA2 GLOB SERPL ELPH-MCNC: 0.79 G/DL (ref 0.43–0.99)
B-GLOBULIN SERPL ELPH-MCNC: 0.8 G/DL (ref 0.5–1.1)
GAMMA GLOB SERPL ELPH-MCNC: 1.42 G/DL (ref 0.67–1.58)
PATHOLOGIST INTERPRETATION SPE: NORMAL
PROT SERPL-MCNC: 7.7 G/DL (ref 6–8.4)

## 2023-05-09 ENCOUNTER — TELEPHONE (OUTPATIENT)
Dept: INTERNAL MEDICINE | Facility: CLINIC | Age: 68
End: 2023-05-09
Payer: OTHER GOVERNMENT

## 2023-05-09 DIAGNOSIS — M54.9 CHRONIC BACK PAIN, UNSPECIFIED BACK LOCATION, UNSPECIFIED BACK PAIN LATERALITY: ICD-10-CM

## 2023-05-09 DIAGNOSIS — M17.0 PRIMARY OSTEOARTHRITIS OF BOTH KNEES: Primary | ICD-10-CM

## 2023-05-09 DIAGNOSIS — G89.29 CHRONIC BACK PAIN, UNSPECIFIED BACK LOCATION, UNSPECIFIED BACK PAIN LATERALITY: ICD-10-CM

## 2023-05-09 NOTE — TELEPHONE ENCOUNTER
----- Message from Paulette Ya sent at 5/8/2023  4:50 PM CDT -----  Contact: [phone]  Pt is requesting doctor order him a transport chair. He has no specific DME company in mind, anyone is okay. Please call him to discuss.

## 2023-05-30 ENCOUNTER — TELEPHONE (OUTPATIENT)
Dept: INTERNAL MEDICINE | Facility: CLINIC | Age: 68
End: 2023-05-30
Payer: OTHER GOVERNMENT

## 2023-05-30 DIAGNOSIS — G89.29 CHRONIC BACK PAIN, UNSPECIFIED BACK LOCATION, UNSPECIFIED BACK PAIN LATERALITY: ICD-10-CM

## 2023-05-30 DIAGNOSIS — M47.816 LUMBAR SPONDYLOSIS: ICD-10-CM

## 2023-05-30 DIAGNOSIS — M54.9 CHRONIC BACK PAIN, UNSPECIFIED BACK LOCATION, UNSPECIFIED BACK PAIN LATERALITY: ICD-10-CM

## 2023-05-30 DIAGNOSIS — M17.0 PRIMARY OSTEOARTHRITIS OF BOTH KNEES: Primary | ICD-10-CM

## 2023-05-30 NOTE — TELEPHONE ENCOUNTER
"Good afternoon. I reeived an order for MRN [id number], Russell Warner, for a 18 inch w/c... Can you please change it to a 22 inch wheelchair per his height and weight and also add the seat frame. If you do not see an option to add the seat frame, you can add it in comments ... "add seat frame"  "

## 2023-07-14 ENCOUNTER — TELEPHONE (OUTPATIENT)
Dept: PODIATRY | Facility: CLINIC | Age: 68
End: 2023-07-14
Payer: OTHER GOVERNMENT

## 2023-07-27 ENCOUNTER — OFFICE VISIT (OUTPATIENT)
Dept: PODIATRY | Facility: CLINIC | Age: 68
End: 2023-07-27
Payer: OTHER GOVERNMENT

## 2023-07-27 VITALS
DIASTOLIC BLOOD PRESSURE: 78 MMHG | HEART RATE: 72 BPM | WEIGHT: 278 LBS | BODY MASS INDEX: 33.85 KG/M2 | HEIGHT: 76 IN | RESPIRATION RATE: 18 BRPM | SYSTOLIC BLOOD PRESSURE: 130 MMHG

## 2023-07-27 DIAGNOSIS — L60.9 DISEASE OF NAIL: ICD-10-CM

## 2023-07-27 DIAGNOSIS — E66.01 MORBID (SEVERE) OBESITY DUE TO EXCESS CALORIES: ICD-10-CM

## 2023-07-27 DIAGNOSIS — L84 CORN OR CALLUS: ICD-10-CM

## 2023-07-27 DIAGNOSIS — E11.3292 TYPE 2 DIABETES MELLITUS WITH MILD NONPROLIFERATIVE RETINOPATHY OF LEFT EYE, WITH LONG-TERM CURRENT USE OF INSULIN, MACULAR EDEMA PRESENCE UNSPECIFIED: Primary | ICD-10-CM

## 2023-07-27 DIAGNOSIS — I10 ESSENTIAL HYPERTENSION: ICD-10-CM

## 2023-07-27 DIAGNOSIS — Z79.4 TYPE 2 DIABETES MELLITUS WITH MILD NONPROLIFERATIVE RETINOPATHY OF LEFT EYE, WITH LONG-TERM CURRENT USE OF INSULIN, MACULAR EDEMA PRESENCE UNSPECIFIED: Primary | ICD-10-CM

## 2023-07-27 PROCEDURE — 11056 PARNG/CUTG B9 HYPRKR LES 2-4: CPT | Mod: S$PBB,,, | Performed by: PODIATRIST

## 2023-07-27 PROCEDURE — 99999 PR PBB SHADOW E&M-EST. PATIENT-LVL IV: ICD-10-PCS | Mod: PBBFAC,,, | Performed by: PODIATRIST

## 2023-07-27 PROCEDURE — 99214 OFFICE O/P EST MOD 30 MIN: CPT | Mod: PBBFAC | Performed by: PODIATRIST

## 2023-07-27 PROCEDURE — 11721 PR DEBRIDEMENT OF NAILS, 6 OR MORE: ICD-10-PCS | Mod: 59,S$PBB,, | Performed by: PODIATRIST

## 2023-07-27 PROCEDURE — 11056 PR TRIM BENIGN HYPERKERATOTIC SKIN LESION,2-4: ICD-10-PCS | Mod: S$PBB,,, | Performed by: PODIATRIST

## 2023-07-27 PROCEDURE — 99499 UNLISTED E&M SERVICE: CPT | Mod: S$PBB,,, | Performed by: PODIATRIST

## 2023-07-27 PROCEDURE — 99499 NO LOS: ICD-10-PCS | Mod: S$PBB,,, | Performed by: PODIATRIST

## 2023-07-27 PROCEDURE — 11721 DEBRIDE NAIL 6 OR MORE: CPT | Mod: 59,PBBFAC | Performed by: PODIATRIST

## 2023-07-27 PROCEDURE — 11056 PARNG/CUTG B9 HYPRKR LES 2-4: CPT | Mod: Q9,PBBFAC | Performed by: PODIATRIST

## 2023-07-27 PROCEDURE — 11721 DEBRIDE NAIL 6 OR MORE: CPT | Mod: 59,S$PBB,, | Performed by: PODIATRIST

## 2023-07-27 PROCEDURE — 99999 PR PBB SHADOW E&M-EST. PATIENT-LVL IV: CPT | Mod: PBBFAC,,, | Performed by: PODIATRIST

## 2023-08-03 NOTE — PROGRESS NOTES
Subjective:     Patient ID: Russell Warner is a 67 y.o. male.    Chief Complaint: PCP (Geoff Bynum  5/15/23) and Diabetic Foot Exam    Russell is a 67 y.o. male who presents to the clinic for evaluation and treatment of high risk feet. Russell has a past medical history of Arthritis, Diabetes mellitus, type 2, Hyperlipidemia, Hypertension, Prostate cancer, and Rotator cuff injury. The patient's chief complaint is long, thick toenails. This patient has documented high risk feet requiring routine maintenance secondary to diabetes mellitis and those secondary complications of diabetes, as mentioned..    PCP: Leann Zavala MD    Date Last Seen by PCP:   Chief Complaint   Patient presents with    PCP     Geoff Bynum  5/15/23    Diabetic Foot Exam       Hemoglobin A1C   Date Value Ref Range Status   11/02/2022 6.3 (H) 4.0 - 5.6 % Final     Comment:     ADA Screening Guidelines:  5.7-6.4%  Consistent with prediabetes  >or=6.5%  Consistent with diabetes    High levels of fetal hemoglobin interfere with the HbA1C  assay. Heterozygous hemoglobin variants (HbS, HgC, etc)do  not significantly interfere with this assay.   However, presence of multiple variants may affect accuracy.     03/11/2021 10.1 (H) 4.0 - 5.6 % Final     Comment:     ADA Screening Guidelines:  5.7-6.4%  Consistent with prediabetes  >or=6.5%  Consistent with diabetes    High levels of fetal hemoglobin interfere with the HbA1C  assay. Heterozygous hemoglobin variants (HbS, HgC, etc)do  not significantly interfere with this assay.   However, presence of multiple variants may affect accuracy.         Review of Systems   Constitutional: Negative for chills, decreased appetite and fever.   Cardiovascular:  Negative for leg swelling.   Skin:  Positive for dry skin, nail changes and poor wound healing.   Musculoskeletal:  Positive for muscle weakness and myalgias. Negative for arthritis, joint pain and joint swelling.   Gastrointestinal:  Negative for  nausea and vomiting.   Neurological:  Positive for loss of balance, numbness and paresthesias.      Objective:     Physical Exam  Vitals reviewed.   Constitutional:       General: He is not in acute distress.     Appearance: He is well-developed. He is not ill-appearing.   Cardiovascular:      Comments: Dorsalis pedis and posterior tibial pulses are diminished Bilaterally. Toes are cool to touch. Feet are warm proximally.There is decreased digital hair. Skin is atrophic, slightly hyperpigmented, and mildly edematous      Musculoskeletal:         General: No tenderness. Normal range of motion.      Comments: Adequate joint range of motion without pain, limitation, nor crepitation Bilateral feet and ankle joints. Muscle strength is 5/5 in all groups bilaterally.         Skin:     General: Skin is warm and dry.      Findings: No ecchymosis, erythema or lesion.      Comments: Nails x8 are elongated by  3-5mm's, thickened by 3-4 mm's, dystrophic, and are darkened in  coloration . Xerosis Bilaterally. No open lesions noted.    Hyperkeratotic tissue noted to plantar R foot x 2     Neurological:      Mental Status: He is alert and oriented to person, place, and time.      Comments: Garvin-Renae 5.07 monofilamant testing is diminished Eduardo feet. Sharp/dull sensation diminished Bilaterally. Light touch absent Bilaterally.       Psychiatric:         Behavior: Behavior normal.         Assessment:      Encounter Diagnoses   Name Primary?   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    Diagnoses and all orders for this visit:    Type 2 diabetes mellitus with mild nonproliferative retinopathy of left eye, with long-term current use  of insulin, macular edema presence unspecified    Essential hypertension    Morbid (severe) obesity due to excess calories    Disease of nail    Corn or callus      I counseled the patient on his conditions, their implications and medical management.        - Shoe inspection. Diabetic Foot Education. Patient reminded of the importance of good nutrition and blood sugar control to help prevent podiatric complications of diabetes. Patient instructed on proper foot hygeine. We discussed wearing proper shoe gear, daily foot inspections, never walking without protective shoe gear, never putting sharp instruments to feet, routine podiatric nail visits every 2-3 months.      - With patient's permission, nails were aggressively reduced and debrided x 8 to their soft tissue attachment mechanically and with electric , removing all offending nail and debris. Patient relates relief following the procedure. He will continue to monitor the areas daily, inspect his feet, wear protective shoe gear when ambulatory, moisturizer to maintain skin integrity and follow in this office in approximately 2-3 months, sooner p.r.n.    - After cleansing the  area w/ alcohol prep pad the above mentioned hyperkeratosis was trimmed utilizing No 15 scapel, to a smooth base with out incident. Patient tolerated this  well and reported comfort to the area x 2

## 2023-08-04 ENCOUNTER — PATIENT MESSAGE (OUTPATIENT)
Dept: ADMINISTRATIVE | Facility: HOSPITAL | Age: 68
End: 2023-08-04
Payer: OTHER GOVERNMENT

## 2023-08-09 DIAGNOSIS — E11.9 TYPE 2 DIABETES MELLITUS WITHOUT COMPLICATION: ICD-10-CM

## 2023-08-15 ENCOUNTER — PATIENT MESSAGE (OUTPATIENT)
Dept: ADMINISTRATIVE | Facility: HOSPITAL | Age: 68
End: 2023-08-15
Payer: OTHER GOVERNMENT

## 2023-08-22 ENCOUNTER — NURSE TRIAGE (OUTPATIENT)
Dept: ADMINISTRATIVE | Facility: CLINIC | Age: 68
End: 2023-08-22
Payer: OTHER GOVERNMENT

## 2023-08-22 NOTE — TELEPHONE ENCOUNTER
Cp pain and numbness  on Saturday from neck radiates down back and right shoulder to right hand and fingertips. Pt also reports severe weakness. Pt instructed to hang up and call 911 now  Reason for Disposition   SEVERE weakness (i.e., unable to walk or barely able to walk, requires support) and new-onset or worsening    Additional Information   Negative: Difficult to awaken or acting confused (e.g., disoriented, slurred speech)   Negative: New neurologic deficit that is present NOW, sudden onset of ANY of the following: * Weakness of the face, arm, or leg on one side of the body* Numbness of the face, arm, or leg on one side of the body* Loss of speech or garbled speech   Negative: Sounds like a life-threatening emergency to the triager    Protocols used: Neurologic Deficit-A-OH

## 2023-10-13 ENCOUNTER — IMMUNIZATION (OUTPATIENT)
Dept: INTERNAL MEDICINE | Facility: CLINIC | Age: 68
End: 2023-10-13
Payer: OTHER GOVERNMENT

## 2023-10-13 PROCEDURE — 99999PBSHW FLU VACCINE - QUADRIVALENT - ADJUVANTED: Mod: PBBFAC,,,

## 2023-10-13 PROCEDURE — 90694 VACC AIIV4 NO PRSRV 0.5ML IM: CPT | Mod: PBBFAC

## 2023-10-13 PROCEDURE — 90471 IMMUNIZATION ADMIN: CPT | Mod: PBBFAC

## 2023-10-13 PROCEDURE — 99999PBSHW FLU VACCINE - QUADRIVALENT - ADJUVANTED: ICD-10-PCS | Mod: PBBFAC,,,

## 2023-10-19 PROBLEM — M79.675 GREAT TOE PAIN, LEFT: Status: ACTIVE | Noted: 2023-10-19

## 2023-10-19 PROBLEM — L97.509 ULCER OF GREAT TOE: Status: ACTIVE | Noted: 2023-10-19

## 2023-10-30 ENCOUNTER — OFFICE VISIT (OUTPATIENT)
Dept: INTERNAL MEDICINE | Facility: CLINIC | Age: 68
End: 2023-10-30
Payer: MEDICARE

## 2023-10-30 VITALS
OXYGEN SATURATION: 98 % | WEIGHT: 266.44 LBS | HEIGHT: 76 IN | SYSTOLIC BLOOD PRESSURE: 140 MMHG | HEART RATE: 100 BPM | DIASTOLIC BLOOD PRESSURE: 60 MMHG | BODY MASS INDEX: 32.45 KG/M2

## 2023-10-30 DIAGNOSIS — N18.30 DIABETES MELLITUS DUE TO UNDERLYING CONDITION WITH STAGE 3 CHRONIC KIDNEY DISEASE, UNSPECIFIED WHETHER LONG TERM INSULIN USE, UNSPECIFIED WHETHER STAGE 3A OR 3B CKD: ICD-10-CM

## 2023-10-30 DIAGNOSIS — I10 ESSENTIAL HYPERTENSION: ICD-10-CM

## 2023-10-30 DIAGNOSIS — E08.22 DIABETES MELLITUS DUE TO UNDERLYING CONDITION WITH STAGE 3 CHRONIC KIDNEY DISEASE, UNSPECIFIED WHETHER LONG TERM INSULIN USE, UNSPECIFIED WHETHER STAGE 3A OR 3B CKD: ICD-10-CM

## 2023-10-30 DIAGNOSIS — M54.9 CHRONIC BACK PAIN, UNSPECIFIED BACK LOCATION, UNSPECIFIED BACK PAIN LATERALITY: ICD-10-CM

## 2023-10-30 DIAGNOSIS — Z79.4 TYPE 2 DIABETES MELLITUS WITH MILD NONPROLIFERATIVE RETINOPATHY, WITH LONG-TERM CURRENT USE OF INSULIN, MACULAR EDEMA PRESENCE UNSPECIFIED, UNSPECIFIED LATERALITY: ICD-10-CM

## 2023-10-30 DIAGNOSIS — R97.20 ELEVATED PSA: ICD-10-CM

## 2023-10-30 DIAGNOSIS — G89.29 CHRONIC BACK PAIN, UNSPECIFIED BACK LOCATION, UNSPECIFIED BACK PAIN LATERALITY: ICD-10-CM

## 2023-10-30 DIAGNOSIS — E11.3299 TYPE 2 DIABETES MELLITUS WITH MILD NONPROLIFERATIVE RETINOPATHY, WITH LONG-TERM CURRENT USE OF INSULIN, MACULAR EDEMA PRESENCE UNSPECIFIED, UNSPECIFIED LATERALITY: ICD-10-CM

## 2023-10-30 DIAGNOSIS — Z00.00 ANNUAL PHYSICAL EXAM: Primary | ICD-10-CM

## 2023-10-30 PROBLEM — Z85.46 HISTORY OF PROSTATE CANCER: Status: RESOLVED | Noted: 2019-10-02 | Resolved: 2023-10-30

## 2023-10-30 PROBLEM — L97.509 ULCER OF GREAT TOE: Status: RESOLVED | Noted: 2023-10-19 | Resolved: 2023-10-30

## 2023-10-30 PROCEDURE — 1159F MED LIST DOCD IN RCRD: CPT | Mod: CPTII,S$GLB,, | Performed by: INTERNAL MEDICINE

## 2023-10-30 PROCEDURE — 3078F DIAST BP <80 MM HG: CPT | Mod: CPTII,S$GLB,, | Performed by: INTERNAL MEDICINE

## 2023-10-30 PROCEDURE — 3077F SYST BP >= 140 MM HG: CPT | Mod: CPTII,S$GLB,, | Performed by: INTERNAL MEDICINE

## 2023-10-30 PROCEDURE — 3078F PR MOST RECENT DIASTOLIC BLOOD PRESSURE < 80 MM HG: ICD-10-PCS | Mod: CPTII,S$GLB,, | Performed by: INTERNAL MEDICINE

## 2023-10-30 PROCEDURE — 3288F FALL RISK ASSESSMENT DOCD: CPT | Mod: CPTII,S$GLB,, | Performed by: INTERNAL MEDICINE

## 2023-10-30 PROCEDURE — 3077F PR MOST RECENT SYSTOLIC BLOOD PRESSURE >= 140 MM HG: ICD-10-PCS | Mod: CPTII,S$GLB,, | Performed by: INTERNAL MEDICINE

## 2023-10-30 PROCEDURE — 1126F AMNT PAIN NOTED NONE PRSNT: CPT | Mod: CPTII,S$GLB,, | Performed by: INTERNAL MEDICINE

## 2023-10-30 PROCEDURE — 1160F PR REVIEW ALL MEDS BY PRESCRIBER/CLIN PHARMACIST DOCUMENTED: ICD-10-PCS | Mod: CPTII,S$GLB,, | Performed by: INTERNAL MEDICINE

## 2023-10-30 PROCEDURE — 3288F PR FALLS RISK ASSESSMENT DOCUMENTED: ICD-10-PCS | Mod: CPTII,S$GLB,, | Performed by: INTERNAL MEDICINE

## 2023-10-30 PROCEDURE — 1159F PR MEDICATION LIST DOCUMENTED IN MEDICAL RECORD: ICD-10-PCS | Mod: CPTII,S$GLB,, | Performed by: INTERNAL MEDICINE

## 2023-10-30 PROCEDURE — 1101F PT FALLS ASSESS-DOCD LE1/YR: CPT | Mod: CPTII,S$GLB,, | Performed by: INTERNAL MEDICINE

## 2023-10-30 PROCEDURE — 1160F RVW MEDS BY RX/DR IN RCRD: CPT | Mod: CPTII,S$GLB,, | Performed by: INTERNAL MEDICINE

## 2023-10-30 PROCEDURE — 99397 PR PREVENTIVE VISIT,EST,65 & OVER: ICD-10-PCS | Mod: GZ,S$GLB,, | Performed by: INTERNAL MEDICINE

## 2023-10-30 PROCEDURE — 3008F PR BODY MASS INDEX (BMI) DOCUMENTED: ICD-10-PCS | Mod: CPTII,S$GLB,, | Performed by: INTERNAL MEDICINE

## 2023-10-30 PROCEDURE — 99999 PR PBB SHADOW E&M-EST. PATIENT-LVL V: ICD-10-PCS | Mod: PBBFAC,,, | Performed by: INTERNAL MEDICINE

## 2023-10-30 PROCEDURE — 99397 PER PM REEVAL EST PAT 65+ YR: CPT | Mod: GZ,S$GLB,, | Performed by: INTERNAL MEDICINE

## 2023-10-30 PROCEDURE — 4010F ACE/ARB THERAPY RXD/TAKEN: CPT | Mod: CPTII,S$GLB,, | Performed by: INTERNAL MEDICINE

## 2023-10-30 PROCEDURE — 1126F PR PAIN SEVERITY QUANTIFIED, NO PAIN PRESENT: ICD-10-PCS | Mod: CPTII,S$GLB,, | Performed by: INTERNAL MEDICINE

## 2023-10-30 PROCEDURE — 1101F PR PT FALLS ASSESS DOC 0-1 FALLS W/OUT INJ PAST YR: ICD-10-PCS | Mod: CPTII,S$GLB,, | Performed by: INTERNAL MEDICINE

## 2023-10-30 PROCEDURE — 99999 PR PBB SHADOW E&M-EST. PATIENT-LVL V: CPT | Mod: PBBFAC,,, | Performed by: INTERNAL MEDICINE

## 2023-10-30 PROCEDURE — 3008F BODY MASS INDEX DOCD: CPT | Mod: CPTII,S$GLB,, | Performed by: INTERNAL MEDICINE

## 2023-10-30 PROCEDURE — 4010F PR ACE/ARB THEARPY RXD/TAKEN: ICD-10-PCS | Mod: CPTII,S$GLB,, | Performed by: INTERNAL MEDICINE

## 2023-10-30 RX ORDER — LOSARTAN POTASSIUM 50 MG/1
25 TABLET ORAL DAILY
Qty: 1 TABLET | Refills: 0
Start: 2023-10-30 | End: 2024-10-29

## 2023-10-30 NOTE — PROGRESS NOTES
Subjective     Patient ID: Russell Warner is a 68 y.o. male.    Chief Complaint: Follow-up and Annual Exam    HPI  [facility].  I am his back up  I am able to view last labs, current med and diagnoses only through [facility].    Intermittent r shoulder pain following rotator cuff surgery.    DM had been controlled.  [facility] manages.  Last a1c 7.3.     Sees podiatry and ophtho there.      Also with hyperlipidemia, ckd 3.      Taking novolog, lantus, ozempic.    He does  losartan 25 mg, according to care everywhere.  He is NOT aware.      He does not have a list of current meds.      GFR 54.  No acute complaints, but wants to make sure he is not being charged excesively for wheelchair.  He uses a cane due to chronic back and knee pain.    PSA elevation - but he was never told he had prostate cancer.      Review of Systems   Constitutional:  Negative for activity change and unexpected weight change.   HENT:  Negative for postnasal drip, rhinorrhea and sinus pressure/congestion.    Respiratory:  Negative for chest tightness and shortness of breath.    Cardiovascular:  Negative for chest pain and leg swelling.   Gastrointestinal:  Negative for abdominal pain, nausea and vomiting.   Genitourinary:  Negative for difficulty urinating, dysuria, hematuria and urgency.   Integumentary:  Negative for rash.   Neurological:  Negative for headaches.   Psychiatric/Behavioral:  Negative for dysphoric mood and sleep disturbance. The patient is not nervous/anxious.           Objective     Physical Exam  Constitutional:       General: He is not in acute distress.     Appearance: He is well-developed. He is not ill-appearing, toxic-appearing or diaphoretic.   Eyes:      General: No scleral icterus.  Neck:      Thyroid: No thyromegaly.      Vascular: No JVD.   Cardiovascular:      Rate and Rhythm: Normal rate and regular rhythm.      Heart sounds: Normal heart sounds. No murmur heard.  Pulmonary:      Effort: Pulmonary effort  is normal. No respiratory distress.      Breath sounds: Normal breath sounds. No stridor. No wheezing, rhonchi or rales.   Abdominal:      General: There is no distension.      Palpations: Abdomen is soft. There is no mass.      Tenderness: There is no abdominal tenderness. There is no guarding.      Hernia: No hernia is present.   Musculoskeletal:      Cervical back: No rigidity or tenderness.      Right lower leg: No edema.      Left lower leg: No edema.   Lymphadenopathy:      Cervical: No cervical adenopathy.   Neurological:      Mental Status: He is alert and oriented to person, place, and time.   Psychiatric:         Mood and Affect: Mood normal.         Behavior: Behavior normal.         Thought Content: Thought content normal.            Assessment and Plan     1. Annual physical exam    2. Elevated PSA    3. Essential hypertension    4. Type 2 diabetes mellitus with mild nonproliferative retinopathy, with long-term current use of insulin, macular edema presence unspecified, unspecified laterality    5. Chronic back pain, unspecified back location, unspecified back pain laterality    6. Diabetes mellitus due to underlying condition with stage 3 chronic kidney disease, unspecified whether long term insulin use, unspecified whether stage 3a or 3b CKD    Other orders  -     losartan (COZAAR) 50 MG tablet; Take 0.5 tablets (25 mg total) by mouth once daily.  Dispense: 1 tablet; Refill: 0             [facility]    Follow up in about 1 year (around 10/30/2024).

## 2023-10-31 ENCOUNTER — PATIENT OUTREACH (OUTPATIENT)
Dept: ADMINISTRATIVE | Facility: HOSPITAL | Age: 68
End: 2023-10-31
Payer: MEDICARE

## 2023-10-31 LAB
CHOLEST SERPL-MSCNC: 163 MG/DL (ref 0–200)
EGFR: 54
HBA1C MFR BLD: 7.3 % (ref 4.2–5.8)
LDLC SERPL CALC-MCNC: 99 MG/DL (ref 0–160)
MICROALB/CREAT RATIO: 236.5
TRIGL SERPL-MCNC: 87 MG/DL (ref 40–160)

## 2023-10-31 NOTE — PROGRESS NOTES

## 2023-11-08 ENCOUNTER — OFFICE VISIT (OUTPATIENT)
Dept: PODIATRY | Facility: CLINIC | Age: 68
End: 2023-11-08
Payer: MEDICARE

## 2023-11-08 VITALS
WEIGHT: 266 LBS | RESPIRATION RATE: 18 BRPM | HEIGHT: 76 IN | HEART RATE: 78 BPM | SYSTOLIC BLOOD PRESSURE: 129 MMHG | DIASTOLIC BLOOD PRESSURE: 77 MMHG | BODY MASS INDEX: 32.39 KG/M2

## 2023-11-08 DIAGNOSIS — E11.3292 TYPE 2 DIABETES MELLITUS WITH MILD NONPROLIFERATIVE RETINOPATHY OF LEFT EYE, WITH LONG-TERM CURRENT USE OF INSULIN, MACULAR EDEMA PRESENCE UNSPECIFIED: Primary | ICD-10-CM

## 2023-11-08 DIAGNOSIS — L60.9 DISEASE OF NAIL: ICD-10-CM

## 2023-11-08 DIAGNOSIS — E66.01 MORBID (SEVERE) OBESITY DUE TO EXCESS CALORIES: ICD-10-CM

## 2023-11-08 DIAGNOSIS — Z79.4 TYPE 2 DIABETES MELLITUS WITH MILD NONPROLIFERATIVE RETINOPATHY OF LEFT EYE, WITH LONG-TERM CURRENT USE OF INSULIN, MACULAR EDEMA PRESENCE UNSPECIFIED: Primary | ICD-10-CM

## 2023-11-08 DIAGNOSIS — L84 CORN OR CALLUS: ICD-10-CM

## 2023-11-08 PROCEDURE — 11056 PR TRIM BENIGN HYPERKERATOTIC SKIN LESION,2-4: ICD-10-PCS | Mod: Q9,S$GLB,, | Performed by: PODIATRIST

## 2023-11-08 PROCEDURE — 99499 NO LOS: ICD-10-PCS | Mod: S$GLB,,, | Performed by: PODIATRIST

## 2023-11-08 PROCEDURE — 11721 PR DEBRIDEMENT OF NAILS, 6 OR MORE: ICD-10-PCS | Mod: 59,Q9,S$GLB, | Performed by: PODIATRIST

## 2023-11-08 PROCEDURE — 11056 PARNG/CUTG B9 HYPRKR LES 2-4: CPT | Mod: Q9,S$GLB,, | Performed by: PODIATRIST

## 2023-11-08 PROCEDURE — 99499 UNLISTED E&M SERVICE: CPT | Mod: S$GLB,,, | Performed by: PODIATRIST

## 2023-11-08 PROCEDURE — 99999 PR PBB SHADOW E&M-EST. PATIENT-LVL III: ICD-10-PCS | Mod: PBBFAC,,, | Performed by: PODIATRIST

## 2023-11-08 PROCEDURE — 11721 DEBRIDE NAIL 6 OR MORE: CPT | Mod: 59,Q9,S$GLB, | Performed by: PODIATRIST

## 2023-11-08 PROCEDURE — 99999 PR PBB SHADOW E&M-EST. PATIENT-LVL III: CPT | Mod: PBBFAC,,, | Performed by: PODIATRIST

## 2023-12-11 NOTE — PROGRESS NOTES
Subjective:     Patient ID: Russell Warner is a 68 y.o. male.    Chief Complaint: PCP (Leann Zavala MD  10/30/23/Internal Medicine/), Diabetic Foot Exam, and Nail Care    Russell is a 68 y.o. male who presents to the clinic for evaluation and treatment of high risk feet. Russell has a past medical history of Arthritis, Diabetes mellitus, type 2, Hyperlipidemia, Hypertension, Prostate cancer, and Rotator cuff injury. The patient's chief complaint is long, thick toenails. This patient has documented high risk feet requiring routine maintenance secondary to diabetes mellitis and those secondary complications of diabetes, as mentioned..    PCP: Leann Zavala MD    Date Last Seen by PCP:   Chief Complaint   Patient presents with    PCP     Leann Zavala MD  10/30/23  Internal Medicine      Diabetic Foot Exam    Nail Care       Hemoglobin A1C   Date Value Ref Range Status   10/16/2023 7.3 (A) 4.2 - 5.8 % Final   11/02/2022 6.3 (H) 4.0 - 5.6 % Final     Comment:     ADA Screening Guidelines:  5.7-6.4%  Consistent with prediabetes  >or=6.5%  Consistent with diabetes    High levels of fetal hemoglobin interfere with the HbA1C  assay. Heterozygous hemoglobin variants (HbS, HgC, etc)do  not significantly interfere with this assay.   However, presence of multiple variants may affect accuracy.     03/11/2021 10.1 (H) 4.0 - 5.6 % Final     Comment:     ADA Screening Guidelines:  5.7-6.4%  Consistent with prediabetes  >or=6.5%  Consistent with diabetes    High levels of fetal hemoglobin interfere with the HbA1C  assay. Heterozygous hemoglobin variants (HbS, HgC, etc)do  not significantly interfere with this assay.   However, presence of multiple variants may affect accuracy.         Review of Systems   Constitutional: Negative for chills, decreased appetite and fever.   Cardiovascular:  Negative for leg swelling.   Skin:  Positive for dry skin, nail changes and poor wound healing. Negative for flushing and itching.    Musculoskeletal:  Positive for muscle weakness and myalgias. Negative for arthritis, joint pain and joint swelling.   Gastrointestinal:  Negative for nausea and vomiting.   Neurological:  Positive for loss of balance, numbness and paresthesias.        Objective:     Physical Exam  Vitals reviewed.   Constitutional:       General: He is not in acute distress.     Appearance: He is well-developed. He is not ill-appearing.   Cardiovascular:      Comments: Dorsalis pedis and posterior tibial pulses are diminished Bilaterally. Toes are cool to touch. Feet are warm proximally.There is decreased digital hair. Skin is atrophic, slightly hyperpigmented, and mildly edematous      Musculoskeletal:         General: No tenderness. Normal range of motion.      Comments: Adequate joint range of motion without pain, limitation, nor crepitation Bilateral feet and ankle joints. Muscle strength is 5/5 in all groups bilaterally.         Skin:     General: Skin is warm and dry.      Findings: No ecchymosis, erythema or lesion.      Comments: Nails x8 are elongated by  3-7mm's, thickened by 3-4 mm's, dystrophic, and are darkened in  coloration . Xerosis Bilaterally. No open lesions noted.    Hyperkeratotic tissue noted to plantar R foot x 2     Neurological:      Mental Status: He is alert and oriented to person, place, and time.      Comments: [place]-Renae 5.07 monofilamant testing is diminished Eduardo feet. Sharp/dull sensation diminished Bilaterally. Light touch absent Bilaterally.       Psychiatric:         Behavior: Behavior normal.           Assessment:      Encounter Diagnoses   Name Primary?    Type 2 diabetes mellitus with mild nonproliferative retinopathy of left eye, with long-term current use of insulin, macular edema presence unspecified Yes    Corn or callus     Disease of nail     Morbid (severe) obesity due to excess calories        Plan:     Russell was seen today for pcp, diabetic foot exam and nail care.    Diagnoses  and all orders for this visit:    Type 2 diabetes mellitus with mild nonproliferative retinopathy of left eye, with long-term current use of insulin, macular edema presence unspecified    Corn or callus    Disease of nail    Morbid (severe) obesity due to excess calories        I counseled the patient on his conditions, their implications and medical management.        - Shoe inspection. Diabetic Foot Education. Patient reminded of the importance of good nutrition and blood sugar control to help prevent podiatric complications of diabetes. Patient instructed on proper foot hygeine. We discussed wearing proper shoe gear, daily foot inspections, never walking without protective shoe gear, never putting sharp instruments to feet, routine podiatric nail visits every 2-3 months.      - With patient's permission, nails were aggressively reduced and debrided x 8 to their soft tissue attachment mechanically and with electric , removing all offending nail and debris. Patient relates relief following the procedure. He will continue to monitor the areas daily, inspect his feet, wear protective shoe gear when ambulatory, moisturizer to maintain skin integrity and follow in this office in approximately 2-3 months, sooner p.r.n.    - After cleansing the  area w/ alcohol prep pad the above mentioned hyperkeratosis was trimmed utilizing No 15 scapel, to a smooth base with out incident. Patient tolerated this  well and reported comfort to the area x 2

## 2023-12-21 ENCOUNTER — TELEPHONE (OUTPATIENT)
Dept: PODIATRY | Facility: CLINIC | Age: 68
End: 2023-12-21
Payer: MEDICARE

## 2023-12-21 NOTE — TELEPHONE ENCOUNTER
----- Message from Cindi Soni sent at 12/21/2023  1:27 PM CST -----  Type:  Same Day Appointment Request    Caller is requesting a same day appointment.  Caller declined first available appointment listed below.    Name of Caller:Pt  When is the first available appointment? 01/02/24   Symptoms: Big toe possibly infected / No redness but look puffy and draining   Best Call Back Number:[phone]  Additional Information:           79

## 2024-01-02 ENCOUNTER — OFFICE VISIT (OUTPATIENT)
Dept: PODIATRY | Facility: CLINIC | Age: 69
End: 2024-01-02
Payer: MEDICARE

## 2024-01-02 VITALS
HEIGHT: 76 IN | BODY MASS INDEX: 33.72 KG/M2 | WEIGHT: 276.88 LBS | DIASTOLIC BLOOD PRESSURE: 71 MMHG | HEART RATE: 81 BPM | SYSTOLIC BLOOD PRESSURE: 120 MMHG

## 2024-01-02 DIAGNOSIS — B35.1 PAIN DUE TO ONYCHOMYCOSIS OF TOENAILS OF BOTH FEET: ICD-10-CM

## 2024-01-02 DIAGNOSIS — M79.675 PAIN DUE TO ONYCHOMYCOSIS OF TOENAILS OF BOTH FEET: ICD-10-CM

## 2024-01-02 DIAGNOSIS — S98.111A AMPUTATED GREAT TOE, RIGHT: ICD-10-CM

## 2024-01-02 DIAGNOSIS — L84 CORN OR CALLUS: ICD-10-CM

## 2024-01-02 DIAGNOSIS — E11.42 DM TYPE 2 WITH DIABETIC PERIPHERAL NEUROPATHY: ICD-10-CM

## 2024-01-02 DIAGNOSIS — S90.422S: Primary | ICD-10-CM

## 2024-01-02 DIAGNOSIS — M79.674 PAIN DUE TO ONYCHOMYCOSIS OF TOENAILS OF BOTH FEET: ICD-10-CM

## 2024-01-02 DIAGNOSIS — L97.529 CHRONIC ULCER OF GREAT TOE OF LEFT FOOT, UNSPECIFIED ULCER STAGE: ICD-10-CM

## 2024-01-02 DIAGNOSIS — Z76.89 ENCOUNTER TO ESTABLISH CARE WITH NEW DOCTOR: ICD-10-CM

## 2024-01-02 PROCEDURE — 99999 PR PBB SHADOW E&M-EST. PATIENT-LVL III: CPT | Mod: PBBFAC,,, | Performed by: PODIATRIST

## 2024-01-02 PROCEDURE — 99214 OFFICE O/P EST MOD 30 MIN: CPT | Mod: 25,S$GLB,, | Performed by: PODIATRIST

## 2024-01-02 PROCEDURE — 11721 DEBRIDE NAIL 6 OR MORE: CPT | Mod: Q7,59,S$GLB, | Performed by: PODIATRIST

## 2024-01-02 PROCEDURE — 11056 PARNG/CUTG B9 HYPRKR LES 2-4: CPT | Mod: Q7,S$GLB,, | Performed by: PODIATRIST

## 2024-01-02 NOTE — PROGRESS NOTES
Subjective:     Patient ID: Russell Warner is a 68 y.o. male.    Chief Complaint: Blister    Patient presents new to this podiatry clinic w/ c/o blister on L big toe x months. States it has been hurting on & off recently; pain level 6/10.    was seen by Dr. Harris twice but was only for toenails & calluses that were trimmed w/ clippers. In the interim, had noted blister after being in a hot tub, w/ pus - went to ED (10/19/23) & given PO as well as topical Abx. Has not tried to self-treat as has had previous problem w/ R great toe leading to amp.  11/8/23 - Dr. Harris  Russell is a 68 y.o. male who presents to the clinic for evaluation and treatment of high risk feet. Russell has a past medical history of Arthritis, Diabetes mellitus, type 2, Hyperlipidemia, Hypertension, Prostate cancer, and Rotator cuff injury. The patient's chief complaint is long, thick toenails. This patient has documented high risk feet requiring routine maintenance secondary to diabetes mellitis and those secondary complications of diabetes, as mentioned..     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    PCP Leann Zavala MD10/30/23  & [facility] - appt.tomorrow 1/3/24    Past Medical History:   Diagnosis Date    Arthritis     Diabetes mellitus, type 2     Hyperlipidemia     Hypertension     Prostate cancer     radiation 2017 at [facility]    Rotator cuff injury      Patient Active Problem List   Diagnosis    Type 2 diabetes mellitus with mild nonproliferative retinopathy, with long-term current use of insulin    Essential hypertension    Erectile dysfunction associated with type 2 diabetes mellitus    Morbid (severe) obesity due to excess calories    Acquired absence of right great toe    Chronic back pain    Primary osteoarthritis of both knees    Right shoulder pain    Hip pain    Rotator cuff arthropathy of right shoulder    Lumbar spondylosis    Great toe pain, left    Elevated PSA    Diabetes mellitus due to underlying condition  with stage 3 chronic kidney disease     Hemoglobin A1C   Date Value Ref Range Status   10/16/2023 7.3 (A) 4.2 - 5.8 % Final   11/02/2022 6.3 (H) 4.0 - 5.6 % Final     Comment:     ADA Screening Guidelines:  5.7-6.4%  Consistent with prediabetes  >or=6.5%  Consistent with diabetes    High levels of fetal hemoglobin interfere with the HbA1C  assay. Heterozygous hemoglobin variants (HbS, HgC, etc)do  not significantly interfere with this assay.   However, presence of multiple variants may affect accuracy.     03/11/2021 10.1 (H) 4.0 - 5.6 % Final     Comment:     ADA Screening Guidelines:  5.7-6.4%  Consistent with prediabetes  >or=6.5%  Consistent with diabetes    High levels of fetal hemoglobin interfere with the HbA1C  assay. Heterozygous hemoglobin variants (HbS, HgC, etc)do  not significantly interfere with this assay.   However, presence of multiple variants may affect accuracy.       Review of Systems   Constitutional: Negative for malaise/fatigue.   Cardiovascular:  Negative for claudication and leg swelling.   Skin:  Positive for color change, dry skin, nail changes, poor wound healing, rash and suspicious lesions. Negative for itching.   Musculoskeletal:  Positive for arthritis, back pain, joint pain, muscle weakness, myalgias and neck pain. Negative for falls, joint swelling and muscle cramps.   Gastrointestinal:  Negative for nausea and vomiting.   Neurological:  Positive for loss of balance, numbness, paresthesias and sensory change. Negative for focal weakness and weakness.   Psychiatric/Behavioral:  The patient is not nervous/anxious.       Objective:     Physical Exam  Vitals reviewed.   Constitutional:       General: He is not in acute distress.     Appearance: He is well-developed. He is obese.   Cardiovascular:      Pulses:           Dorsalis pedis pulses are 1+ on the right side and 1+ on the left side.   Musculoskeletal:         General: Tenderness (area of cc intermittent - 'when hits it')  present. No swelling or signs of injury. Normal range of motion.      Right lower leg: Edema present.      Left lower leg: Edema present.        Feet:       Comments: Adequate joint range of motion without pain, limitation, nor crepitation Bilateral feet and ankle joints. Muscle strength is 5/5 in all groups bilaterally.         Feet:      Right foot:      Skin integrity: Dry skin present. No blister, erythema or callus.      Left foot:      Skin integrity: Callus and dry skin present. No blister (sequelae L hallux digital tuft) or erythema.   Skin:     General: Skin is cool and dry.      Capillary Refill: Capillary refill takes 2 to 3 seconds.      Findings: Lesion present. No bruising, ecchymosis or erythema.      Comments: Toes are cool to touch, feet are warm proximally BLE.    Decreased digital hair & sparse hair growth BK B/L.   Skin is dry & atrophic, slightly hyperpigmented, mildly edematous B/L.    Distal medial L hallux w/ significant hyperkeratosis & underlying unstageable defect/ indentation in digital tuft.    Hyperkeratosis medial HIPJ L.    Toenails 1st L, 2nd R, 3rd, 4th & 5th  B/L are hypertrophic, thickened, dystrophic, discolored, w/ crumbly subungual debris. Tender to distal nail plate pressure, w/out periungual skin abnormality noted other than ulcer distal medial L hallux.     Neurological:      Mental Status: He is alert and oriented to person, place, and time.      Sensory: Sensory deficit present.      Motor: No weakness or abnormal muscle tone.      Gait: Gait normal.      Comments: [place]-Renae 5.07 monofilamant testing is diminished B/L; sharp/dull sensation & light touch diminished.    Paresthesias including numbness & burning qhs B/L feet w/ no clearly identified trigger or source; no hyperemia.   Psychiatric:         Mood and Affect: Mood and affect normal. Mood is not anxious.         Speech: Speech is tangential.         Behavior: Behavior normal. Behavior is cooperative.        Assessment:      Encounter Diagnoses   Name Primary?    DM type 2 with diabetic peripheral neuropathy     Amputated great toe, right     Corn or callus     Chronic ulcer of great toe of left foot, unspecified ulcer stage     Blister (nonthermal), left great toe, sequela Yes    Pain due to onychomycosis of toenails of both feet     Encounter to establish care with new doctor      Problem List Items Addressed This Visit    None  Visit Diagnoses       Blister (nonthermal), left great toe, sequela    -  Primary    Relevant Orders    Routine Foot Care (Completed)    DM type 2 with diabetic peripheral neuropathy        Relevant Orders    Routine Foot Care (Completed)    Amputated great toe, right        Corn or callus        Chronic ulcer of great toe of left foot, unspecified ulcer stage        Relevant Orders    Routine Foot Care (Completed)    Pain due to onychomycosis of toenails of both feet        Relevant Orders    Routine Foot Care (Completed)    Encounter to establish care with new doctor               Plan:     Russell was seen today for blister.    Diagnoses and all orders for this visit:    Blister (nonthermal), left great toe, sequela  -     Routine Foot Care    DM type 2 with diabetic peripheral neuropathy  -     Routine Foot Care    Amputated great toe, right    Corn or callus    Chronic ulcer of great toe of left foot, unspecified ulcer stage  -     Routine Foot Care    Pain due to onychomycosis of toenails of both feet  -     Routine Foot Care    Encounter to establish care with new doctor    I counseled the patient on his conditions, their implications & medical mgmt.    - Shoe inspection. Diabetic Foot Education. Patient reminded of the importance of good nutrition & blood sugar control to help prevent podiatric complications of diabetes. Patient instructed on proper foot hygeine. We discussed wearing proper shoe gear, daily foot inspections, never walking w/out protective shoe gear, routine  podiatric visits every 3 months, sooner prn.      - With patient's permission, nails were aggressively reduced & debrided x 10 to their soft tissue attachment mechanically, removing all offending nail & debris. Patient relates relief following the procedure.  Utilizing a #15 scalpel, I trimmed hyperkeratoses L hallux x 2, debrided the ulcer down to smooth base w/ residual maceration centrally.  The patient will continue to monitor the areas daily, inspect the feet, wear protective shoe gear when ambulatory, & moisturizer to maintain skin integrity.         A total of 36 mins.was spent on chart review, patient visit & documentation.

## 2024-01-09 NOTE — PROCEDURES
Routine Foot Care    Date/Time: 1/2/2024 1:45 PM    Performed by: Yanelis Ayon DPM  Authorized by: Yanelis Ayon DPM    Consent Done?:  Yes (Verbal)  Hyperkeratotic Skin Lesions?: Yes    Number of trimmed lesions:  2  Location(s):  Left 1st Toe    Nail Care Type:  Debride(Left 1st Toe, Left 2nd Toe, Left 3rd Toe, Left 4th Toe, Right 3rd Toe, Right 2nd Toe, Right 5th Toe, Right 4th Toe and Left 5th Toe)  Patient tolerance:  Patient tolerated the procedure well with no immediate complications

## 2024-02-09 ENCOUNTER — OFFICE VISIT (OUTPATIENT)
Dept: PODIATRY | Facility: CLINIC | Age: 69
End: 2024-02-09
Payer: MEDICARE

## 2024-02-09 VITALS
HEART RATE: 77 BPM | BODY MASS INDEX: 33.31 KG/M2 | HEIGHT: 76 IN | SYSTOLIC BLOOD PRESSURE: 125 MMHG | WEIGHT: 273.5 LBS | DIASTOLIC BLOOD PRESSURE: 78 MMHG

## 2024-02-09 DIAGNOSIS — L60.3 DYSTROPHIC NAIL: ICD-10-CM

## 2024-02-09 DIAGNOSIS — M79.675 GREAT TOE PAIN, LEFT: Primary | ICD-10-CM

## 2024-02-09 DIAGNOSIS — L97.529 CHRONIC ULCER OF GREAT TOE OF LEFT FOOT, UNSPECIFIED ULCER STAGE: ICD-10-CM

## 2024-02-09 DIAGNOSIS — S98.111A AMPUTATED GREAT TOE, RIGHT: ICD-10-CM

## 2024-02-09 DIAGNOSIS — E11.42 DM TYPE 2 WITH DIABETIC PERIPHERAL NEUROPATHY: ICD-10-CM

## 2024-02-09 PROCEDURE — 99213 OFFICE O/P EST LOW 20 MIN: CPT | Mod: 25,S$GLB,, | Performed by: PODIATRIST

## 2024-02-09 PROCEDURE — 97597 DBRDMT OPN WND 1ST 20 CM/<: CPT | Mod: 59,S$GLB,, | Performed by: PODIATRIST

## 2024-02-09 PROCEDURE — 11721 DEBRIDE NAIL 6 OR MORE: CPT | Mod: 59,Q7,S$GLB, | Performed by: PODIATRIST

## 2024-02-09 PROCEDURE — 11730 AVULSION NAIL PLATE SIMPLE 1: CPT | Mod: T1,S$GLB,, | Performed by: PODIATRIST

## 2024-02-09 PROCEDURE — 99999 PR PBB SHADOW E&M-EST. PATIENT-LVL III: CPT | Mod: PBBFAC,,, | Performed by: PODIATRIST

## 2024-02-09 NOTE — PROGRESS NOTES
Subjective:     Patient ID: Russell Warner is a 68 y.o. male.    Chief Complaint: Wound Check    Patient is here for wound check L foot. Having pain but not every night; pain level 6/10. Also needs nails cut as they are starting to hurt w/ shoe pressure. On feet a lot as takes care of his 93 y/o mother.  1/2/24  Patient presents new to this podiatry clinic w/ c/o blister on L big toe x months. States it has been hurting on & off recently; pain level 6/10.    was seen by Dr. Harris twice but was only for toenails & calluses that were trimmed w/ clippers. In the interim, had noted blister after being in a hot tub, w/ pus - went to ED (10/19/23) & given PO as well as topical Abx. Has not tried to self-treat as has had previous problem w/ L great toe leading to amp.  11/8/23 - Dr. Harris  Russell is a 68 y.o. male who presents to the clinic for evaluation and treatment of high risk feet. Russell has a past medical history of Arthritis, Diabetes mellitus, type 2, Hyperlipidemia, Hypertension, Prostate cancer, and Rotator cuff injury. The patient's chief complaint is long, thick toenails. This patient has documented high risk feet requiring routine maintenance secondary to diabetes mellitis and those secondary complications of diabetes, as mentioned..     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    PCP Leann Zavala MD10/30/23  & [facility] - appt.tomorrow 1/3/24    Past Medical History:   Diagnosis Date    Arthritis     Diabetes mellitus, type 2     Hyperlipidemia     Hypertension     Prostate cancer     radiation 2017 at [facility]    Rotator cuff injury      Patient Active Problem List   Diagnosis    Type 2 diabetes mellitus with mild nonproliferative retinopathy, with long-term current use of insulin    Essential hypertension    Erectile dysfunction associated with type 2 diabetes mellitus    Morbid (severe) obesity due to excess calories    Acquired absence of right great toe    Chronic back pain    Primary  osteoarthritis of both knees    Right shoulder pain    Hip pain    Rotator cuff arthropathy of right shoulder    Lumbar spondylosis    Great toe pain, left    Elevated PSA    Diabetes mellitus due to underlying condition with stage 3 chronic kidney disease     Hemoglobin A1C   Date Value Ref Range Status   10/16/2023 7.3 (A) 4.2 - 5.8 % Final   11/02/2022 6.3 (H) 4.0 - 5.6 % Final     Comment:     ADA Screening Guidelines:  5.7-6.4%  Consistent with prediabetes  >or=6.5%  Consistent with diabetes    High levels of fetal hemoglobin interfere with the HbA1C  assay. Heterozygous hemoglobin variants (HbS, HgC, etc)do  not significantly interfere with this assay.   However, presence of multiple variants may affect accuracy.     03/11/2021 10.1 (H) 4.0 - 5.6 % Final     Comment:     ADA Screening Guidelines:  5.7-6.4%  Consistent with prediabetes  >or=6.5%  Consistent with diabetes    High levels of fetal hemoglobin interfere with the HbA1C  assay. Heterozygous hemoglobin variants (HbS, HgC, etc)do  not significantly interfere with this assay.   However, presence of multiple variants may affect accuracy.       Review of Systems   Constitutional: Negative for malaise/fatigue.   Cardiovascular:  Negative for claudication and leg swelling.   Skin:  Positive for color change, dry skin, nail changes, poor wound healing, rash and suspicious lesions. Negative for itching.   Musculoskeletal:  Positive for arthritis, back pain, joint pain, muscle weakness, myalgias and neck pain. Negative for falls, joint swelling and muscle cramps.   Gastrointestinal:  Negative for nausea and vomiting.   Neurological:  Positive for loss of balance, numbness, paresthesias and sensory change. Negative for focal weakness and weakness.   Psychiatric/Behavioral:  The patient is not nervous/anxious.       Objective:     Physical Exam  Vitals reviewed.   Constitutional:       Appearance: He is well-developed. He is obese.   Cardiovascular:      Pulses:            Dorsalis pedis pulses are 1+ on the right side and 1+ on the left side.   Musculoskeletal:         General: Tenderness (area of cc intermittent - 'when hits it') present. No swelling or signs of injury. Normal range of motion.      Right lower leg: Edema present.      Left lower leg: Edema present.        Feet:       Comments: Adequate joint range of motion without pain, limitation, nor crepitation Bilateral feet and ankle joints. Muscle strength is 5/5 in all groups bilaterally.         Feet:      Right foot:      Skin integrity: Dry skin present. No blister, erythema or callus.      Left foot:      Skin integrity: Callus and dry skin present. No blister (sequelae L hallux digital tuft) or erythema.      Comments: Equinus B/L ankles w/ < 90 deg foot to leg noted w/ knees extended.      MS strength of extrinsics to foot & ankle B/L + 5/5 in DF/PF/Inv/Ev to resistance w/ no reproduction of pain in any direction.     Passive ROM of ankle & pedal joints is painless & w/out crepitation B/L.    Toenails 1st L, 3rd, 4th, 5th  B/L are hypertrophic, thickened, dystrophic, discolored, w/ crumbly subungual debris. L 2nd toenail loose from nail bed w/out disruption of skin.  Tender to distal nail plate pressure, w/out periungual skin abnormality noted.     Skin:     General: Skin is cool and dry.      Capillary Refill: Capillary refill takes 2 to 3 seconds.      Findings: Lesion present. No bruising, ecchymosis or erythema.      Comments: Toes are cool to touch, feet are warm proximally BLE.    Decreased digital hair & sparse hair growth BK B/L.   Skin is dry & atrophic, slightly hyperpigmented, mildly edematous B/L.    Distal medial L hallux w/ significant hyperkeratosis & underlying unstageable defect/ indentation in digital tuft.    Hyperkeratosis medial HIPJ L.    Toenails 1st L, 2nd R, 3rd, 4th & 5th  B/L are hypertrophic, thickened, dystrophic, discolored, w/ crumbly subungual debris. Tender to distal nail plate  pressure, w/out periungual skin abnormality noted other than ulcer distal medial L hallux.     Neurological:      Mental Status: He is alert and oriented to person, place, and time.      Sensory: Sensory deficit present.      Motor: No weakness or abnormal muscle tone.      Gait: Gait normal.      Comments: Basin-Renae 5.07 monofilamant testing is diminished B/L; sharp/dull sensation & light touch diminished.    Paresthesias including numbness & burning qhs B/L feet w/ no clearly identified trigger or source; no hyperemia.   Psychiatric:         Mood and Affect: Mood and affect normal. Mood is not anxious.         Speech: Speech is tangential.         Behavior: Behavior normal. Behavior is cooperative.       Assessment:      Encounter Diagnoses   Name Primary?    Great toe pain, left Yes    Dystrophic nail     Amputated great toe, right     Chronic ulcer of great toe of left foot, unspecified ulcer stage     DM type 2 with diabetic peripheral neuropathy      Problem List Items Addressed This Visit          Orthopedic    Great toe pain, left - Primary    Relevant Orders    Debridement nails     Other Visit Diagnoses       Dystrophic nail        Relevant Orders    Nail Removal L 2nd toe    Amputated great toe, right        Chronic ulcer of great toe of left foot, unspecified ulcer stage        Relevant Orders    Wound Debridement L great toe    DM type 2 with diabetic peripheral neuropathy              Plan:     Russell was seen today for wound check.    Diagnoses and all orders for this visit:    Great toe pain, left  -     Debridement nails    Dystrophic nail  -     Nail Removal L 2nd toe    Amputated great toe, right    Chronic ulcer of great toe of left foot, unspecified ulcer stage  -     Wound Debridement L great toe    DM type 2 with diabetic peripheral neuropathy    I counseled the patient on his conditions, their implications & medical mgmt.    - Shoe inspection. Diabetic Foot Education. Patient reminded  of the importance of good nutrition & blood sugar control to help prevent podiatric complications of diabetes. Patient instructed on proper foot hygeine. We discussed wearing proper shoe gear, daily foot inspections, never walking w/out protective shoe gear, routine podiatric visits every 3 months, sooner prn.      - With patient's permission, nails were aggressively reduced & debrided x 10 to their soft tissue attachment mechanically, removing all offending nail & debris. Patient relates relief following the procedure.    L 2nd toenail is avulsed in toto.    Callus/ ulcer L hallux debrided.        A total of 25 mins.was spent on chart review, patient visit & documentation.

## 2024-02-21 NOTE — PROCEDURES
Debridement nails    Date/Time: 2/9/2024 1:45 PM    Performed by: Yanelis Ayon DPM  Authorized by: Yanelis Ayon DPM    Consent Done?:  Yes (Verbal)    Nail Care Type:  Debride(Left 1st Toe, Left 3rd Toe, Left 4th Toe, Left 5th Toe, Right 2nd Toe, Right 3rd Toe, Right 4th Toe and Right 5th Toe)  Patient tolerance:  Patient tolerated the procedure well with no immediate complications  Wound Debridement L great toe    Date/Time: 2/9/2024 1:45 PM    Performed by: Yanelis Ayon DPM  Authorized by: Yanelis Ayon DPM      Wound Details:    Location:  Left foot    Location:  Left 1st Toe    Type of Debridement:  Excisional       Length (cm):  5.2       Area (sq cm):  13       Width (cm):  2.5       Percent Debrided (%):  100       Depth (cm):  0       Total Area Debrided (sq cm):  13    Depth of debridement:  Epidermis/Dermis    Tissue debrided:  Epidermis and Dermis    Devitalized tissue debrided:  Callus    Instruments:  Nippers and Blade  Bleeding:  None  Nail Removal L 2nd toe    Date/Time: 2/9/2024 1:45 PM    Performed by: Yanelis Ayon DPM  Authorized by: Yanelis Ayon DPM    Consent Done?:  Yes (Verbal)  Location:     Location:  Left foot    Location detail:  Left second toe  Procedure Details:     Preparation:  Skin prepped with alcohol    Amount removed:  Complete    Wedge excision of skin of nail fold: No      Nail bed sutured?: No      Nail matrix removed:  None    Patient tolerance:  Patient tolerated the procedure well with no immediate complications

## 2024-02-26 ENCOUNTER — OFFICE VISIT (OUTPATIENT)
Dept: PODIATRY | Facility: CLINIC | Age: 69
End: 2024-02-26
Payer: MEDICARE

## 2024-02-26 VITALS
HEIGHT: 76 IN | HEART RATE: 79 BPM | BODY MASS INDEX: 32.97 KG/M2 | WEIGHT: 270.75 LBS | DIASTOLIC BLOOD PRESSURE: 82 MMHG | SYSTOLIC BLOOD PRESSURE: 144 MMHG

## 2024-02-26 DIAGNOSIS — S91.209S AVULSION OF TOENAIL, SEQUELA: ICD-10-CM

## 2024-02-26 DIAGNOSIS — E11.42 DM TYPE 2 WITH DIABETIC PERIPHERAL NEUROPATHY: ICD-10-CM

## 2024-02-26 DIAGNOSIS — L97.529 CHRONIC ULCER OF GREAT TOE OF LEFT FOOT, UNSPECIFIED ULCER STAGE: Primary | ICD-10-CM

## 2024-02-26 DIAGNOSIS — S98.111A AMPUTATED GREAT TOE, RIGHT: ICD-10-CM

## 2024-02-26 DIAGNOSIS — M79.2 NEUROPATHIC PAIN: ICD-10-CM

## 2024-02-26 DIAGNOSIS — M62.838 MUSCLE SPASM: ICD-10-CM

## 2024-02-26 PROCEDURE — 97597 DBRDMT OPN WND 1ST 20 CM/<: CPT | Mod: S$GLB,,, | Performed by: PODIATRIST

## 2024-02-26 PROCEDURE — 99999 PR PBB SHADOW E&M-EST. PATIENT-LVL IV: CPT | Mod: PBBFAC,,, | Performed by: PODIATRIST

## 2024-02-26 PROCEDURE — 99213 OFFICE O/P EST LOW 20 MIN: CPT | Mod: 25,S$GLB,, | Performed by: PODIATRIST

## 2024-02-26 RX ORDER — METHOCARBAMOL 750 MG/1
750 TABLET, FILM COATED ORAL 2 TIMES DAILY PRN
COMMUNITY
End: 2024-02-26 | Stop reason: SDUPTHER

## 2024-02-26 RX ORDER — GABAPENTIN 300 MG/1
300 CAPSULE ORAL 3 TIMES DAILY
Qty: 270 CAPSULE | Refills: 1 | Status: SHIPPED | OUTPATIENT
Start: 2024-02-26

## 2024-02-26 RX ORDER — METHOCARBAMOL 750 MG/1
750 TABLET, FILM COATED ORAL 2 TIMES DAILY PRN
Qty: 180 TABLET | Refills: 1 | Status: SHIPPED | OUTPATIENT
Start: 2024-02-26

## 2024-02-26 NOTE — PROGRESS NOTES
Subjective:     Patient ID: Russell Warner is a 68 y.o. male.    Chief Complaint: Wound Check    Patient presents for wound check. He continues to have pain level of 6/10 B/L unrelated to wound - uses a walking stick d/t back & knee pain. Has chronic pain, neuropathic pain & muscle spasms for which he takes Gabapentin & Lidocaine patches as well as Robaxin respectively per [facility]; needs refill until he can get to them again. Also sees IM & [facility] prn.  2/9/24  Patient is here for wound check L foot. Having pain but not every night; pain level 6/10. Also needs nails cut as they are starting to hurt w/ shoe pressure. On feet a lot as takes care of his 91 y/o mother.  1/2/24  Patient presents new to this podiatry clinic w/ c/o blister on L big toe x months. States it has been hurting on & off recently; pain level 6/10.   States was seen by Dr. Harris twice but was only for toenails & calluses that were trimmed w/ clippers. In the interim, had noted blister after being in a hot tub, w/ pus - went to ED (10/19/23) & given PO as well as topical Abx. Has not tried to self-treat as has had previous problem w/ L great toe leading to amp.  11/8/23 - Dr. Harris  Russell is a 68 y.o. male who presents to the clinic for evaluation and treatment of high risk feet. Russell has a past medical history of Arthritis, Diabetes mellitus, type 2, Hyperlipidemia, Hypertension, Prostate cancer, and Rotator cuff injury. The patient's chief complaint is long, thick toenails. This patient has documented high risk feet requiring routine maintenance secondary to diabetes mellitis and those secondary complications of diabetes, as mentioned..    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    PCP Leann Zavala MD10/30/23  & [facility] - appt.tomorrow 1/3/24    Past Medical History:   Diagnosis Date    Arthritis     Diabetes mellitus, type 2     Hyperlipidemia     Hypertension     Prostate cancer     radiation 2017 at [facility]    Rotator cuff injury       Patient Active Problem List   Diagnosis    Type 2 diabetes mellitus with mild nonproliferative retinopathy, with long-term current use of insulin    Essential hypertension    Erectile dysfunction associated with type 2 diabetes mellitus    Morbid (severe) obesity due to excess calories    Acquired absence of right great toe    Chronic back pain    Primary osteoarthritis of both knees    Right shoulder pain    Hip pain    Rotator cuff arthropathy of right shoulder    Lumbar spondylosis    Great toe pain, left    Elevated PSA    Diabetes mellitus due to underlying condition with stage 3 chronic kidney disease     Hemoglobin A1C   Date Value Ref Range Status   10/16/2023 7.3 (A) 4.2 - 5.8 % Final   11/02/2022 6.3 (H) 4.0 - 5.6 % Final     Comment:     ADA Screening Guidelines:  5.7-6.4%  Consistent with prediabetes  >or=6.5%  Consistent with diabetes    High levels of fetal hemoglobin interfere with the HbA1C  assay. Heterozygous hemoglobin variants (HbS, HgC, etc)do  not significantly interfere with this assay.   However, presence of multiple variants may affect accuracy.     03/11/2021 10.1 (H) 4.0 - 5.6 % Final     Comment:     ADA Screening Guidelines:  5.7-6.4%  Consistent with prediabetes  >or=6.5%  Consistent with diabetes    High levels of fetal hemoglobin interfere with the HbA1C  assay. Heterozygous hemoglobin variants (HbS, HgC, etc)do  not significantly interfere with this assay.   However, presence of multiple variants may affect accuracy.       Review of Systems   Constitutional: Negative for malaise/fatigue.   Cardiovascular:  Negative for claudication and leg swelling.   Skin:  Positive for color change, dry skin, nail changes, poor wound healing, rash and suspicious lesions. Negative for itching.   Musculoskeletal:  Positive for arthritis, back pain, joint pain, muscle weakness, myalgias and neck pain. Negative for falls, joint swelling and muscle cramps.   Gastrointestinal:  Negative for nausea  and vomiting.   Neurological:  Positive for loss of balance, numbness, paresthesias and sensory change. Negative for focal weakness and weakness.   Psychiatric/Behavioral:  The patient is not nervous/anxious.       Objective:     Physical Exam  Vitals reviewed.   Constitutional:       General: He is not in acute distress.     Appearance: He is well-developed. He is obese.   Cardiovascular:      Pulses:           Dorsalis pedis pulses are 1+ on the right side and 1+ on the left side.   Musculoskeletal:         General: Tenderness (area of cc intermittent - L great toe but only 'when hits it') present. No swelling or signs of injury. Normal range of motion.      Right lower leg: Edema present.      Left lower leg: Edema present.        Feet:       Comments: Adequate joint range of motion without pain, limitation, nor crepitation Bilateral feet and ankle joints. Muscle strength is 5/5 in all groups bilaterally.         Feet:      Right foot:      Skin integrity: Dry skin present. No blister, erythema or callus.      Left foot:      Skin integrity: Callus and dry skin present. No blister (sequelae L hallux digital tuft) or erythema.      Comments: Equinus B/L ankles w/ < 90 deg foot to leg noted w/ knees extended.      MS strength of extrinsics to foot & ankle B/L + 5/5 in DF/PF/Inv/Ev to resistance w/ no reproduction of pain in any direction.     Passive ROM of ankle & pedal joints is painless & w/out crepitation B/L.    Toenails 1st L, 3rd, 4th, 5th  B/L are hypertrophic, thickened, dystrophic, discolored, w/ crumbly subungual debris. L 2nd toenail loose from nail bed w/out disruption of skin.  Tender to distal nail plate pressure, w/out periungual skin abnormality noted.     Skin:     General: Skin is cool and dry.      Capillary Refill: Capillary refill takes 2 to 3 seconds.      Findings: Lesion present. No bruising, ecchymosis or erythema.      Comments: Toes are cool to touch, feet are warm proximally  BLE.    Decreased digital hair & sparse hair growth BK B/L.   Skin is dry & atrophic, slightly hyperpigmented, mildly edematous B/L.    Distal medial L hallux to HIPJ  extending plantar & laterally w/ entire area dried over previous area( see pix) 5.2 x 2.5 cm & underlying unstageable ulcer plantar lateral, w/ light maceration remaining after debridement; no active drainage nor exudate. No erythema nor edema. NSI.    Toenails 1st L, 2nd R, 3rd, 4th & 5th  B/L are thickened, dystrophic, discolored, w/ crumbly subungual debris. Absent 2nd L toenail w/ dry nail bed.     Neurological:      Mental Status: He is alert and oriented to person, place, and time.      Sensory: Sensory deficit present.      Motor: No weakness or abnormal muscle tone.      Gait: Gait normal.      Comments: Frederick-Renae 5.07 monofilamant testing is diminished B/L; sharp/dull sensation & light touch diminished.    Paresthesias including numbness & burning qhs B/L feet w/ no clearly identified trigger or source; no hyperemia.  No TTP tarsal tunnel B/L nor w/ palpation IMS B/L.   Psychiatric:         Mood and Affect: Mood and affect normal. Mood is not anxious.         Behavior: Behavior normal. Behavior is cooperative.     2/9/24      Assessment:      Encounter Diagnoses   Name Primary?    Neuropathic pain     Muscle spasm     DM type 2 with diabetic peripheral neuropathy     Chronic ulcer of great toe of left foot, unspecified ulcer stage Yes    Avulsion of toenail, sequela     Amputated great toe, right      Problem List Items Addressed This Visit    None  Visit Diagnoses       Chronic ulcer of great toe of left foot, unspecified ulcer stage    -  Primary    Relevant Orders    Wound Debridement L hallux    Neuropathic pain        Relevant Medications    gabapentin (NEURONTIN) 300 MG capsule    Muscle spasm        Relevant Medications    methocarbamoL (ROBAXIN) 750 MG Tab    DM type 2 with diabetic peripheral neuropathy        Avulsion of  toenail, sequela        Amputated great toe, right              Plan:     Russell was seen today for wound check.    Diagnoses and all orders for this visit:    Chronic ulcer of great toe of left foot, unspecified ulcer stage  -     Wound Debridement L hallux    Neuropathic pain  -     gabapentin (NEURONTIN) 300 MG capsule; Take 1 capsule (300 mg total) by mouth 3 (three) times daily.    Muscle spasm  -     methocarbamoL (ROBAXIN) 750 MG Tab; Take 1 tablet (750 mg total) by mouth 2 (two) times daily as needed.    DM type 2 with diabetic peripheral neuropathy    Avulsion of toenail, sequela    Amputated great toe, right      I counseled the patient on his conditions, their implications & medical mgmt.    - Shoe inspection. Diabetic Foot Education. Patient reminded of the importance of good blood sugar control to help prevent podiatric complications of diabetes. Patient instructed on proper foot hygeine. We discussed wearing proper shoe gear, daily foot inspections, never walking w/out protective shoe gear, annual or twice annual DM foot exam, sooner prn.      Remaining ulcer L hallux debrided. Continue to monitor. Use a light bandage or bandaid qd after bathing until completely healed/ dried.    F/u 3 wks.prn - patient will call.        A total of 23 mins.was spent on chart review, patient visit & documentation.

## 2024-03-04 NOTE — PROCEDURES
Wound Debridement L hallux    Date/Time: 2/26/2024 1:45 PM    Performed by: Yanelis Ayon DPM  Authorized by: Yanelis Ayon DPM    Consent Done?:  Yes (Verbal)    Wound Details:    Location:  Left foot    Location:  Left 1st Toe    Type of Debridement:  Excisional       Length (cm):  5.2       Area (sq cm):  13       Width (cm):  2.5       Percent Debrided (%):  100       Depth (cm):  0       Total Area Debrided (sq cm):  13    Depth of debridement:  Epidermis/Dermis    Tissue debrided:  Epidermis and Dermis    Devitalized tissue debrided:  Necrotic/Eschar    Instruments:  Nippers and Blade  Bleeding:  Minimal  Hemostasis Achieved: Yes  Method Used:  Pressure  Patient tolerance:  Patient tolerated the procedure well with no immediate complications

## 2024-03-18 ENCOUNTER — OFFICE VISIT (OUTPATIENT)
Dept: PODIATRY | Facility: CLINIC | Age: 69
End: 2024-03-18
Payer: MEDICARE

## 2024-03-18 VITALS
WEIGHT: 269.81 LBS | BODY MASS INDEX: 32.85 KG/M2 | HEART RATE: 88 BPM | DIASTOLIC BLOOD PRESSURE: 76 MMHG | SYSTOLIC BLOOD PRESSURE: 125 MMHG | HEIGHT: 76 IN

## 2024-03-18 DIAGNOSIS — M20.41 ACQUIRED HAMMER TOE DEFORMITY OF LESSER TOE OF RIGHT FOOT: ICD-10-CM

## 2024-03-18 DIAGNOSIS — M79.674 PAIN DUE TO ONYCHOMYCOSIS OF TOENAILS OF BOTH FEET: ICD-10-CM

## 2024-03-18 DIAGNOSIS — Z51.89 VISIT FOR WOUND CHECK: Primary | ICD-10-CM

## 2024-03-18 DIAGNOSIS — B35.1 PAIN DUE TO ONYCHOMYCOSIS OF TOENAILS OF BOTH FEET: ICD-10-CM

## 2024-03-18 DIAGNOSIS — S98.111A AMPUTATED GREAT TOE, RIGHT: ICD-10-CM

## 2024-03-18 DIAGNOSIS — L97.529 CHRONIC ULCER OF GREAT TOE OF LEFT FOOT, UNSPECIFIED ULCER STAGE: ICD-10-CM

## 2024-03-18 DIAGNOSIS — M79.2 NEUROPATHIC PAIN: ICD-10-CM

## 2024-03-18 DIAGNOSIS — R26.89 BALANCE PROBLEM: ICD-10-CM

## 2024-03-18 DIAGNOSIS — E11.42 DM TYPE 2 WITH DIABETIC PERIPHERAL NEUROPATHY: ICD-10-CM

## 2024-03-18 DIAGNOSIS — M79.675 PAIN DUE TO ONYCHOMYCOSIS OF TOENAILS OF BOTH FEET: ICD-10-CM

## 2024-03-18 PROCEDURE — 99999 PR PBB SHADOW E&M-EST. PATIENT-LVL IV: CPT | Mod: PBBFAC,,, | Performed by: PODIATRIST

## 2024-03-18 PROCEDURE — 97597 DBRDMT OPN WND 1ST 20 CM/<: CPT | Mod: S$GLB,,, | Performed by: PODIATRIST

## 2024-03-18 PROCEDURE — 11720 DEBRIDE NAIL 1-5: CPT | Mod: 59,S$GLB,, | Performed by: PODIATRIST

## 2024-03-18 PROCEDURE — 99214 OFFICE O/P EST MOD 30 MIN: CPT | Mod: 25,S$GLB,, | Performed by: PODIATRIST

## 2024-03-18 NOTE — PROGRESS NOTES
Subjective:     Patient ID: Russell Warner is a 68 y.o. male.    Chief Complaint: Wound Check    Patient was last here 6 wks.ago for wound check L foot. Patient states Gabapentin helps even w/ qhs sx; only time has some pain is if his BG is elevated when he forgets his Insulin. Having some balance problems d/t absent R hallux; still [facility] - has not has any inserts w/ prosthetic filler R 1st amp.though - [facility].  2/6/24  Patient presents for wound check. He continues to have pain level of 6/10 B/L unrelated to wound - uses a walking stick d/t back & knee pain. Has chronic pain, neuropathic pain & muscle spasms for which he takes Gabapentin & Lidocaine patches as well as Robaxin [facility] per [facility]; needs refill until he can get to them again. Also sees [facility] & [facility] prn.  2/9/24  Patient is here for wound check L foot. Having pain but not every night; pain level 6/10. Also needs nails cut as they are starting to hurt w/ shoe pressure. On feet a lot as takes care of his 91 y/o mother.  1/2/24  Patient presents new to this podiatry clinic w/ c/o blister on L big toe x months. States it has been hurting on & off recently; pain level 6/10.   States was seen by Dr. Harris twice but was only for toenails & calluses that were trimmed w/ clippers. In the interim, had noted blister after being in a hot tub, w/ pus - went to ED (10/19/23) & given PO as well as topical Abx. Has not tried to self-treat as has had previous problem w/ L great toe leading to amp.  11/8/23 - Dr. Harris  Russell is a 68 y.o. male who presents to the clinic for evaluation and treatment of high risk feet. Russell has a past medical history of Arthritis, Diabetes mellitus, type 2, Hyperlipidemia, Hypertension, Prostate cancer, and Rotator cuff injury. The patient's chief complaint is long, thick toenails. This patient has documented high risk feet requiring routine maintenance secondary to diabetes mellitis and  those secondary complications of diabetes, as mentioned..    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    PCP Leann Zavala MD10/30/23  & [facility] - appt.tomorrow 1/3/24    Past Medical History:   Diagnosis Date    Arthritis     Diabetes mellitus, type 2     Hyperlipidemia     Hypertension     Prostate cancer     radiation 2017 at [facility]    Rotator cuff injury      Patient Active Problem List   Diagnosis    Type 2 diabetes mellitus with mild nonproliferative retinopathy, with long-term current use of insulin    Essential hypertension    Erectile dysfunction associated with type 2 diabetes mellitus    Morbid (severe) obesity due to excess calories    Acquired absence of right great toe    Chronic back pain    Primary osteoarthritis of both knees    Right shoulder pain    Hip pain    Rotator cuff arthropathy of right shoulder    Lumbar spondylosis    Great toe pain, left    Elevated PSA    Diabetes mellitus due to underlying condition with stage 3 chronic kidney disease     Hemoglobin A1C   Date Value Ref Range Status   10/16/2023 7.3 (A) 4.2 - 5.8 % Final   11/02/2022 6.3 (H) 4.0 - 5.6 % Final     Comment:     ADA Screening Guidelines:  5.7-6.4%  Consistent with prediabetes  >or=6.5%  Consistent with diabetes    High levels of fetal hemoglobin interfere with the HbA1C  assay. Heterozygous hemoglobin variants (HbS, HgC, etc)do  not significantly interfere with this assay.   However, presence of multiple variants may affect accuracy.     03/11/2021 10.1 (H) 4.0 - 5.6 % Final     Comment:     ADA Screening Guidelines:  5.7-6.4%  Consistent with prediabetes  >or=6.5%  Consistent with diabetes    High levels of fetal hemoglobin interfere with the HbA1C  assay. Heterozygous hemoglobin variants (HbS, HgC, etc)do  not significantly interfere with this assay.   However, presence of multiple variants may affect accuracy.       Review of Systems   Constitutional: Negative for malaise/fatigue.   Cardiovascular:   Negative for claudication and leg swelling.   Skin:  Positive for color change, dry skin, nail changes, poor wound healing, rash and suspicious lesions. Negative for itching.   Musculoskeletal:  Positive for arthritis, back pain, joint pain, muscle weakness, myalgias and neck pain. Negative for falls, joint swelling and muscle cramps.   Gastrointestinal:  Negative for nausea and vomiting.   Neurological:  Positive for loss of balance, numbness, paresthesias and sensory change. Negative for focal weakness and weakness.   Psychiatric/Behavioral:  The patient is not nervous/anxious.       Objective:     Physical Exam  Vitals reviewed.   Constitutional:       Appearance: He is well-developed. He is obese.   Cardiovascular:      Pulses:           Dorsalis pedis pulses are 1+ on the right side and 1+ on the left side.   Musculoskeletal:         General: Tenderness (L great toe but only 'when hits it') present. No swelling or signs of injury. Normal range of motion.      Right lower leg: Edema present.      Left lower leg: Edema present.      Right foot: Deformity present.      Left foot: Deformity present.        Feet:       Comments: Adequate joint range of motion without pain, limitation, nor crepitation Bilateral feet and ankle joints. Muscle strength is 5/5 in all groups bilaterally.         Feet:      Right foot:      Skin integrity: Dry skin present. No blister, erythema or callus.      Left foot:      Skin integrity: Callus and dry skin present. No blister (sequelae L hallux digital tuft) or erythema.      Comments: Equinus B/L ankles w/ < 90 deg foot to leg noted w/ knees extended.      MS strength of extrinsics to foot & ankle B/L + 5/5 in DF/PF/Inv/Ev to resistance w/ no reproduction of pain in any direction.     Passive ROM of ankle & pedal joints is painless & w/out crepitation B/L.    Toenails 1st L, 2nd & 3rd R are hypertrophic, thickened, dystrophic, discolored.  Absent L 2nd toenail.     Skin:     General:  Skin is cool and dry.      Capillary Refill: Capillary refill takes 2 to 3 seconds.      Findings: Lesion present. No bruising, ecchymosis or erythema.      Comments: Toes are cool to touch, feet are warm proximally BLE.    Decreased digital hair & sparse hair growth BK B/L.   Skin is dry & atrophic, slightly hyperpigmented, mildly edematous B/L.    Distal medial L hallux remaining wound only; resolution of wound from HIPJ extending plantar & laterally w/ entire area dried ( see pix).  Hyperkeratosis over 2 x 2.5 cm; w/ debridement, only remaining central ulcer 0.3 cm diameter w/ mild surrounding macerated but healed skin.NSI.    Toenails 1st L, 2nd R, 3rd, 4th & 5th  B/L are thickened, dystrophic, discolored, w/ crumbly subungual debris. Absent 2nd L toenail w/ dry nail bed.     Neurological:      Mental Status: He is alert and oriented to person, place, and time.      Sensory: Sensory deficit present.      Motor: No weakness or abnormal muscle tone.      Gait: Gait normal.      Comments: [facility] 5.07 testing is diminished B/L; sharp/dull sensation & light touch diminished.    Paresthesias including numbness & burning qhs B/L feet w/ no clearly identified trigger or source; no hyperemia.  No TTP tarsal tunnel B/L nor w/ palpation IMS B/L.   Psychiatric:         Mood and Affect: Mood and affect normal. Mood is not anxious.         Behavior: Behavior normal. Behavior is cooperative.     3/18/24          2/9/24      Assessment:      Encounter Diagnoses   Name Primary?    DM type 2 with diabetic peripheral neuropathy     Amputated great toe, right     Visit for wound check Yes    Chronic ulcer of great toe of left foot, unspecified ulcer stage     Neuropathic pain     Acquired hammer toe deformity of lesser toe of right foot     Balance problem     Pain due to onychomycosis of toenails of both feet      Problem List Items Addressed This Visit    None  Visit Diagnoses       Visit for wound check    -  Primary    DM type 2  with diabetic peripheral neuropathy        Relevant Orders    Nail debridement    DIABETIC SHOES FOR HOME USE    Amputated great toe, right        Relevant Orders    DIABETIC SHOES FOR HOME USE    Chronic ulcer of great toe of left foot, unspecified ulcer stage        Relevant Orders    Wound Debridement L hallux    DIABETIC SHOES FOR HOME USE    Neuropathic pain        Acquired hammer toe deformity of lesser toe of right foot        Relevant Orders    DIABETIC SHOES FOR HOME USE    Balance problem        Relevant Orders    DIABETIC SHOES FOR HOME USE    Pain due to onychomycosis of toenails of both feet        Relevant Orders    Nail debridement          Plan:     Russell was seen today for wound check.    Diagnoses and all orders for this visit:    Visit for wound check    DM type 2 with diabetic peripheral neuropathy  -     Nail debridement  -     DIABETIC SHOES FOR HOME USE    Amputated great toe, right  -     DIABETIC SHOES FOR HOME USE    Chronic ulcer of great toe of left foot, unspecified ulcer stage  -     Wound Debridement L hallux  -     DIABETIC SHOES FOR HOME USE    Neuropathic pain    Acquired hammer toe deformity of lesser toe of right foot  -     DIABETIC SHOES FOR HOME USE    Balance problem  -     DIABETIC SHOES FOR HOME USE    Pain due to onychomycosis of toenails of both feet  -     Nail debridement    I counseled the patient on his conditions, their implications & medical mgmt.    - Shoe inspection. Diabetic Foot Education. Patient reminded of the importance of good blood sugar control to help prevent podiatric complications of diabetes. Patient instructed on proper foot hygeine. We discussed wearing proper shoe gear, daily foot inspections, never walking w/out protective shoe gear, annual or twice annual DM foot exam, sooner prn.      Remaining callus & ulcer L distal hallux debrided. Use a light bandage or bandaid qd after bathing until completely healed/ dried.    W/ patient's permission, the  toenails L hallux, R 2nd & 3rd were aggressively reduced & debrided using a nail nipper, removing all offending nail & debris.    Continue Gabapentin 300 mg tid - has 3 months w/ a refill (through 8/26/24)    [facility] - advised that he needs to check that R inserts have filler prosthesis for absent R great toe, to prevent concerns w/ remaining toes as well as for balance.  In the meantime, Rx DM shoe written today as well.    F/u 4-6wks.prn for final wound check L hallux & to resume routine DM foot care after.    Dispensed another pair of gelstraps for MLA B/L.        A total of 36 mins.was spent on chart review, patient visit & documentation.

## 2024-03-19 NOTE — PROCEDURES
Nail debridement    Date/Time: 3/18/2024 1:30 PM    Performed by: Yanelis Ayon DPM  Authorized by: Yanelis Ayon DPM    Consent Done?:  Yes (Verbal)    Nail Care Type:  Debride(Left 1st Toe, Right 2nd Toe and Right 3rd Toe)  Patient tolerance:  Patient tolerated the procedure well with no immediate complications  Wound Debridement L hallux    Date/Time: 3/18/2024 1:30 PM    Performed by: Yanelis Ayon DPM  Authorized by: Yanelis Ayon DPM    Consent Done?:  Yes (Verbal)    Wound Details:    Location:  Left foot    Location:  Left 1st Toe    Type of Debridement:  Excisional       Length (cm):  2       Area (sq cm):  5       Width (cm):  2.5       Percent Debrided (%):  100       Depth (cm):  0.1       Total Area Debrided (sq cm):  5    Depth of debridement:  Epidermis/Dermis    Tissue debrided:  Epidermis and Dermis    Devitalized tissue debrided:  Callus and Slough    Instruments:  Nippers and Blade  Bleeding:  Minimal  Hemostasis Achieved: Yes  Method Used:  Pressure and Silver Nitrate  Patient tolerance:  Patient tolerated the procedure well with no immediate complications

## 2024-04-30 ENCOUNTER — OFFICE VISIT (OUTPATIENT)
Dept: PODIATRY | Facility: CLINIC | Age: 69
End: 2024-04-30
Payer: MEDICARE

## 2024-04-30 VITALS
WEIGHT: 275.88 LBS | HEIGHT: 76 IN | SYSTOLIC BLOOD PRESSURE: 143 MMHG | DIASTOLIC BLOOD PRESSURE: 80 MMHG | BODY MASS INDEX: 33.6 KG/M2 | HEART RATE: 71 BPM

## 2024-04-30 DIAGNOSIS — M79.674 PAIN DUE TO ONYCHOMYCOSIS OF TOENAILS OF BOTH FEET: ICD-10-CM

## 2024-04-30 DIAGNOSIS — B35.1 PAIN DUE TO ONYCHOMYCOSIS OF TOENAILS OF BOTH FEET: ICD-10-CM

## 2024-04-30 DIAGNOSIS — M79.675 PAIN DUE TO ONYCHOMYCOSIS OF TOENAILS OF BOTH FEET: ICD-10-CM

## 2024-04-30 DIAGNOSIS — L97.529 CHRONIC ULCER OF GREAT TOE OF LEFT FOOT, UNSPECIFIED ULCER STAGE: ICD-10-CM

## 2024-04-30 DIAGNOSIS — I87.2 EDEMA OF BOTH LOWER EXTREMITIES DUE TO PERIPHERAL VENOUS INSUFFICIENCY: ICD-10-CM

## 2024-04-30 DIAGNOSIS — E11.42 DM TYPE 2 WITH DIABETIC PERIPHERAL NEUROPATHY: Primary | ICD-10-CM

## 2024-04-30 PROCEDURE — 1125F AMNT PAIN NOTED PAIN PRSNT: CPT | Mod: CPTII,S$GLB,, | Performed by: PODIATRIST

## 2024-04-30 PROCEDURE — 99214 OFFICE O/P EST MOD 30 MIN: CPT | Mod: 25,S$GLB,, | Performed by: PODIATRIST

## 2024-04-30 PROCEDURE — 3077F SYST BP >= 140 MM HG: CPT | Mod: CPTII,S$GLB,, | Performed by: PODIATRIST

## 2024-04-30 PROCEDURE — 3288F FALL RISK ASSESSMENT DOCD: CPT | Mod: CPTII,S$GLB,, | Performed by: PODIATRIST

## 2024-04-30 PROCEDURE — 97597 DBRDMT OPN WND 1ST 20 CM/<: CPT | Mod: S$GLB,,, | Performed by: PODIATRIST

## 2024-04-30 PROCEDURE — 99999 PR PBB SHADOW E&M-EST. PATIENT-LVL IV: CPT | Mod: PBBFAC,,, | Performed by: PODIATRIST

## 2024-04-30 PROCEDURE — 1101F PT FALLS ASSESS-DOCD LE1/YR: CPT | Mod: CPTII,S$GLB,, | Performed by: PODIATRIST

## 2024-04-30 PROCEDURE — 3079F DIAST BP 80-89 MM HG: CPT | Mod: CPTII,S$GLB,, | Performed by: PODIATRIST

## 2024-04-30 PROCEDURE — 3008F BODY MASS INDEX DOCD: CPT | Mod: CPTII,S$GLB,, | Performed by: PODIATRIST

## 2024-04-30 NOTE — PROGRESS NOTES
Subjective:     Patient ID: Russell Warner is a 68 y.o. male.    Chief Complaint: Wound Check    Patient is here for wound check L foot, as well as nail care - nails debrided last visit. States has pain level 6/10 d/t neuropathy, back & knee pain, but helped by Gabapentin.  3/18/24  Patient was last here 6 wks.ago for wound check L foot. Patient states Gabapentin helps even w/ qhs sx; only time has some pain is if his BG is elevated when he forgets his Insulin. Having some balance problems d/t absent R hallux; still [facility] - has not has any inserts w/ prosthetic filler R 1st amp.though - [facility].  2/6/24  Patient presents for wound check. He continues to have pain level of 6/10 B/L unrelated to wound - uses a walking stick d/t back & knee pain. Has chronic pain, neuropathic pain & muscle spasms for which he takes Gabapentin & Lidocaine patches as well as Robaxin [facility] per [facility]; needs refill until he can get to them again. Also sees [facility] & [facility] prn.  2/9/24  Patient is here for wound check L foot. Having pain but not every night; pain level 6/10. Also needs nails cut as they are starting to hurt w/ shoe pressure. On feet a lot as takes care of his 91 y/o mother.  1/2/24  Patient presents new to this podiatry clinic w/ c/o blister on L big toe x months. States it has been hurting on & off recently; pain level 6/10.   States was seen by Dr. Harris twice but was only for toenails & calluses that were trimmed w/ clippers. In the interim, had noted blister after being in a hot tub, w/ pus - went to ED (10/19/23) & given PO as well as topical Abx. Has not tried to self-treat as has had previous problem w/ L great toe leading to amp.  11/8/23 - Dr. Harris  Russell is a 68 y.o. male who presents to the clinic for evaluation and treatment of high risk feet. Russell has a past medical history of Arthritis, Diabetes mellitus, type 2, Hyperlipidemia, Hypertension, Prostate cancer, and  Rotator cuff injury. The patient's chief complaint is long, thick toenails. This patient has documented high risk feet requiring routine maintenance secondary to diabetes mellitis and those secondary complications of diabetes, as mentioned..    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    PCP Leann Zavala MD10/30/23  & [facility] - appt.tomorrow 1/3/24    Past Medical History:   Diagnosis Date    Arthritis     Diabetes mellitus, type 2     Hyperlipidemia     Hypertension     Prostate cancer     radiation 2017 at [facility]    Rotator cuff injury      Patient Active Problem List   Diagnosis    Type 2 diabetes mellitus with mild nonproliferative retinopathy, with long-term current use of insulin    Essential hypertension    Erectile dysfunction associated with type 2 diabetes mellitus    Morbid (severe) obesity due to excess calories    Acquired absence of right great toe    Chronic back pain    Primary osteoarthritis of both knees    Right shoulder pain    Hip pain    Rotator cuff arthropathy of right shoulder    Lumbar spondylosis    Great toe pain, left    Elevated PSA    Diabetes mellitus due to underlying condition with stage 3 chronic kidney disease     Hemoglobin A1C   Date Value Ref Range Status   10/16/2023 7.3 (A) 4.2 - 5.8 % Final   11/02/2022 6.3 (H) 4.0 - 5.6 % Final     Comment:     ADA Screening Guidelines:  5.7-6.4%  Consistent with prediabetes  >or=6.5%  Consistent with diabetes    High levels of fetal hemoglobin interfere with the HbA1C  assay. Heterozygous hemoglobin variants (HbS, HgC, etc)do  not significantly interfere with this assay.   However, presence of multiple variants may affect accuracy.     03/11/2021 10.1 (H) 4.0 - 5.6 % Final     Comment:     ADA Screening Guidelines:  5.7-6.4%  Consistent with prediabetes  >or=6.5%  Consistent with diabetes    High levels of fetal hemoglobin interfere with the HbA1C  assay. Heterozygous hemoglobin variants (HbS, HgC, etc)do  not significantly  interfere with this assay.   However, presence of multiple variants may affect accuracy.       Review of Systems   Constitutional: Negative for malaise/fatigue.   Cardiovascular:  Negative for claudication and leg swelling.   Skin:  Positive for color change, dry skin, nail changes, poor wound healing, rash and suspicious lesions. Negative for itching.   Musculoskeletal:  Positive for arthritis, back pain, joint pain, muscle weakness, myalgias and neck pain. Negative for falls, joint swelling and muscle cramps.   Gastrointestinal:  Negative for nausea and vomiting.   Neurological:  Positive for loss of balance, numbness, paresthesias and sensory change. Negative for focal weakness and weakness.   Psychiatric/Behavioral:  The patient is not nervous/anxious.       Objective:     Physical Exam  Vitals reviewed.   Constitutional:       General: He is not in acute distress.     Appearance: He is well-developed. He is obese.   Cardiovascular:      Pulses:           Dorsalis pedis pulses are 1+ on the right side and 1+ on the left side.   Musculoskeletal:         General: Tenderness (L great toe but only 'when hits it') present. No swelling or signs of injury. Normal range of motion.      Right lower leg: Edema present.      Left lower leg: Edema present.      Right foot: Deformity (hallux interphalangeus) present.      Left foot: Deformity present.        Feet:       Comments: Adequate joint range of motion without pain, limitation, nor crepitation Bilateral feet and ankle joints. Muscle strength is 5/5 in all groups bilaterally.         Feet:      Right foot:      Skin integrity: Dry skin present. No blister, erythema or callus.      Left foot:      Skin integrity: Callus and dry skin present. No blister (sequelae L hallux digital tuft) or erythema.      Comments: Equinus B/L ankles w/ < 90 deg foot to leg noted w/ knees extended.      MS strength of extrinsics to foot & ankle B/L + 5/5 in DF/PF/Inv/Ev to resistance w/  no reproduction of pain in any direction.     Passive ROM of ankle & pedal joints is painless & w/out crepitation B/L.    Toenails 1st L, 2nd & 3rd R are hypertrophic, thickened, dystrophic, discolored.  Absent L 2nd toenail.     Skin:     General: Skin is cool and dry.      Capillary Refill: Capillary refill takes 2 to 3 seconds.      Findings: Lesion present. No bruising, ecchymosis or erythema.      Comments: Toes are cool to touch, feet are warm proximally BLE.    Decreased digital hair & sparse hair growth BK B/L.   Skin is dry & atrophic, slightly hyperpigmented, mildly edematous B/L.    Distal medial L hallux wound w/ minimal underlying macertion after debridement of hyperkeratosis; resolution of wound from HIPJ extending plantar & laterally w/ entire area dried ( see pix).  Hyperkeratosis over 2.5 x 1.5 cm; w/ debridement, ulcer 0.3 cm length w/<1mm width w/ mild surrounding macerated skin. NSI.    Toenails 1st L, 2nd R, 3rd, 4th & 5th  B/L are thickened, dystrophic, discolored, w/ crumbly subungual debris.   Absent 2nd L toenail w/ dry nail bed.     Neurological:      Mental Status: He is alert and oriented to person, place, and time.      Sensory: Sensory deficit present.      Motor: No weakness or abnormal muscle tone.      Gait: Gait normal.      Comments: [facility] 5.07 testing is diminished B/L; sharp/dull sensation & light touch diminished.    Paresthesias including numbness & burning qhs B/L feet w/ no clearly identified trigger or source; no hyperemia.  No TTP tarsal tunnel B/L nor w/ palpation IMS B/L.   Psychiatric:         Mood and Affect: Mood and affect normal. Mood is not anxious.         Behavior: Behavior normal. Behavior is cooperative.     3/18/24      2/9/24      Assessment:      Encounter Diagnoses   Name Primary?    DM type 2 with diabetic peripheral neuropathy Yes    Pain due to onychomycosis of toenails of both feet     Edema of both lower extremities due to peripheral venous insufficiency      Chronic ulcer of great toe of left foot, unspecified ulcer stage      Problem List Items Addressed This Visit    None  Visit Diagnoses       DM type 2 with diabetic peripheral neuropathy    -  Primary    Pain due to onychomycosis of toenails of both feet        Edema of both lower extremities due to peripheral venous insufficiency        Chronic ulcer of great toe of left foot, unspecified ulcer stage        Relevant Orders    Wound Debridement L hallux          Plan:     Russell was seen today for wound check.    Diagnoses and all orders for this visit:    DM type 2 with diabetic peripheral neuropathy    Pain due to onychomycosis of toenails of both feet    Edema of both lower extremities due to peripheral venous insufficiency    Chronic ulcer of great toe of left foot, unspecified ulcer stage  -     Wound Debridement L hallux    I counseled the patient on his conditions, their implications & medical mgmt.    - Shoe inspection. Diabetic Foot Education. Patient reminded of the importance of good blood sugar control to help prevent podiatric complications of diabetes. Patient instructed on proper foot hygeine. We discussed wearing proper shoe gear, daily foot inspections, never walking w/out protective shoe gear, annual or twice annual DM foot exam, sooner prn.      Remaining callus & ulcer L distal hallux debrided. Use bandaid qd after bathing until completely healed/ dried.    Courtesy debridement of toenails L hallux, R 2nd & 3rd using a nail nipper, removing all offending nail & debris.    Continue Gabapentin 300 mg tid - has refills through 8/26/24    [facility] - advised that he needs to check that has R filler prosthesis for absent R great toe, & had Rx DM shoe written last visit 3/25/24 as well.  Continue gelstraps for MLA B/L.    F/u 6wks.prn for final wound check L hallux & routine DM foot care after.        A total of 35 mins.was spent on chart review, patient visit &  documentation.   Normal

## 2024-05-09 NOTE — PROCEDURES
Wound Debridement L hallux    Date/Time: 4/30/2024 2:45 PM    Performed by: Yanelis Ayon DPM  Authorized by: Yanelis Ayon DPM    Consent Done?:  Yes (Verbal)    Wound Details:    Location:  Left foot    Location:  Left 1st Toe    Type of Debridement:  Excisional       Length (cm):  2.5       Area (sq cm):  3.75       Width (cm):  1.5       Percent Debrided (%):  100       Depth (cm):  0.1       Total Area Debrided (sq cm):  3.75    Depth of debridement:  Epidermis/Dermis    Tissue debrided:  Epidermis and Dermis    Devitalized tissue debrided:  Callus    Instruments:  Blade and Nippers  Bleeding:  None  Patient tolerance:  Patient tolerated the procedure well with no immediate complications

## 2024-06-10 ENCOUNTER — PATIENT MESSAGE (OUTPATIENT)
Dept: INTERNAL MEDICINE | Facility: CLINIC | Age: 69
End: 2024-06-10
Payer: MEDICARE

## 2024-06-12 ENCOUNTER — OFFICE VISIT (OUTPATIENT)
Dept: PODIATRY | Facility: CLINIC | Age: 69
End: 2024-06-12
Payer: MEDICARE

## 2024-06-12 ENCOUNTER — TELEPHONE (OUTPATIENT)
Dept: PODIATRY | Facility: CLINIC | Age: 69
End: 2024-06-12
Payer: MEDICARE

## 2024-06-12 VITALS
HEART RATE: 70 BPM | WEIGHT: 260.06 LBS | DIASTOLIC BLOOD PRESSURE: 81 MMHG | HEIGHT: 76 IN | BODY MASS INDEX: 31.67 KG/M2 | SYSTOLIC BLOOD PRESSURE: 145 MMHG

## 2024-06-12 DIAGNOSIS — S98.111A AMPUTATED GREAT TOE, RIGHT: ICD-10-CM

## 2024-06-12 DIAGNOSIS — B35.1 PAIN DUE TO ONYCHOMYCOSIS OF TOENAILS OF BOTH FEET: ICD-10-CM

## 2024-06-12 DIAGNOSIS — L84 CORN OR CALLUS: ICD-10-CM

## 2024-06-12 DIAGNOSIS — E11.42 DM TYPE 2 WITH DIABETIC PERIPHERAL NEUROPATHY: ICD-10-CM

## 2024-06-12 DIAGNOSIS — M79.674 PAIN DUE TO ONYCHOMYCOSIS OF TOENAILS OF BOTH FEET: ICD-10-CM

## 2024-06-12 DIAGNOSIS — M79.675 PAIN DUE TO ONYCHOMYCOSIS OF TOENAILS OF BOTH FEET: ICD-10-CM

## 2024-06-12 DIAGNOSIS — L97.521 ULCER OF TOE, LEFT, LIMITED TO BREAKDOWN OF SKIN: ICD-10-CM

## 2024-06-12 DIAGNOSIS — S91.332A PUNCTURE WOUND OF LEFT FOOT, INITIAL ENCOUNTER: Primary | ICD-10-CM

## 2024-06-12 DIAGNOSIS — L97.529 CHRONIC ULCER OF GREAT TOE OF LEFT FOOT, UNSPECIFIED ULCER STAGE: ICD-10-CM

## 2024-06-12 PROCEDURE — 99999 PR PBB SHADOW E&M-EST. PATIENT-LVL III: CPT | Mod: PBBFAC,,, | Performed by: PODIATRIST

## 2024-06-12 NOTE — PROGRESS NOTES
Subjective:     Patient ID: Russell Warner is a 68 y.o. male.    Chief Complaint: Foot Injury    Patient was added on to today's pm schedule.  He had stepped on a screw through his shoe & did not know it until put shoe back on & re-injured it; noticed bleeding. Had stepped on screw/ nail in his garage. Went to ED 5/7/24; states & given tetanus & pain med. EMR shows had Xrays, given tetanus vaccine; also PO & topical Abx. F/u w/ PCP for exam 5/14/24.  Here for DM foot care as well - last tx was 3/18/24.  Also, c/o back & hip pain so [facility] - was already on Gabapentin which makes him sleepy anyway so could rest.  4/30/24  Patient is here for wound check L foot, as well as nail care - nails debrided last visit. States has pain level 6/10 d/t neuropathy, back & knee pain, but helped by Gabapentin.  3/18/24  Patient was last here 6 wks.ago for wound check L foot. Patient states Gabapentin helps even w/ qhs sx; only time has some pain is if his BG is elevated when he forgets his Insulin. Having some balance problems d/t absent R hallux; still [facility] D[facility] shoes [facility] - has not has any inserts w/ prosthetic filler R 1st amp.though - will [facility] in that regard.  2/6/24  Patient presents for wound check. He continues to have pain level of 6/10 B/L unrelated to wound - uses a walking stick d/t back & knee pain. Has chronic pain, neuropathic pain & muscle spasms for which he takes Gabapentin & Lidocaine patches as well as Robaxin respectively per [facility]; needs refill until he can get to them again. Also sees IM & [facility] prn.  2/9/24  Patient is here for wound check L foot. Having pain but not every night; pain level 6/10. Also needs nails cut as they are starting to hurt w/ shoe pressure. On feet a lot as takes care of his 91 y/o mother.  1/2/24  Patient presents new to this podiatry clinic w/ c/o blister on L big toe x months. States it has been hurting on & off recently; pain level 6/10.   States was seen  by Dr. Harris twice but was only for toenails & calluses that were trimmed w/ clippers. In the interim, had noted blister after being in a hot tub, w/ pus - went to ED (10/19/23) & given PO as well as topical Abx. Has not tried to self-treat as has had previous problem w/ L great toe leading to amp.  11/8/23 - Dr. Harris  Russell is a 68 y.o. male who presents to the clinic for evaluation and treatment of high risk feet. Russell has a past medical history of Arthritis, Diabetes mellitus, type 2, Hyperlipidemia, Hypertension, Prostate cancer, and Rotator cuff injury. The patient's chief complaint is long, thick toenails. This patient has documented high risk feet requiring routine maintenance secondary to diabetes mellitis and those secondary complications of diabetes, as mentioned..    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    PCP Leann Zavala MD10/30/23  & [facility] - appt.tomorrow 1/3/24    Past Medical History:   Diagnosis Date    Arthritis     Diabetes mellitus, type 2     Hyperlipidemia     Hypertension     Prostate cancer     radiation 2017 at [facility]    Rotator cuff injury      Patient Active Problem List   Diagnosis    Type 2 diabetes mellitus with mild nonproliferative retinopathy, with long-term current use of insulin    Essential hypertension    Erectile dysfunction associated with type 2 diabetes mellitus    Morbid (severe) obesity due to excess calories    Acquired absence of right great toe    Chronic back pain    Primary osteoarthritis of both knees    Right shoulder pain    Hip pain    Rotator cuff arthropathy of right shoulder    Lumbar spondylosis    Great toe pain, left    Elevated PSA    Diabetes mellitus due to underlying condition with stage 3 chronic kidney disease     [facility] 5/14/[facility] A1c 7.0 %  Hemoglobin A1C   Date Value Ref Range Status   10/16/2023 7.3 (A) 4.2 - 5.8 % Final   11/02/2022 6.3 (H) 4.0 - 5.6 % Final     Comment:     ADA Screening Guidelines:  5.7-6.4%  Consistent with  prediabetes  >or=6.5%  Consistent with diabetes    High levels of fetal hemoglobin interfere with the HbA1C  assay. Heterozygous hemoglobin variants (HbS, HgC, etc)do  not significantly interfere with this assay.   However, presence of multiple variants may affect accuracy.     03/11/2021 10.1 (H) 4.0 - 5.6 % Final     Comment:     ADA Screening Guidelines:  5.7-6.4%  Consistent with prediabetes  >or=6.5%  Consistent with diabetes    High levels of fetal hemoglobin interfere with the HbA1C  assay. Heterozygous hemoglobin variants (HbS, HgC, etc)do  not significantly interfere with this assay.   However, presence of multiple variants may affect accuracy.       Review of Systems   Constitutional: Negative for malaise/fatigue.   Cardiovascular:  Negative for claudication and leg swelling.   Skin:  Positive for color change, dry skin, nail changes, poor wound healing, rash and suspicious lesions. Negative for itching.   Musculoskeletal:  Positive for arthritis, back pain, joint pain, muscle weakness, myalgias and neck pain. Negative for falls, joint swelling and muscle cramps.   Gastrointestinal:  Negative for nausea and vomiting.   Neurological:  Positive for loss of balance, numbness, paresthesias and sensory change. Negative for focal weakness and weakness.   Psychiatric/Behavioral:  The patient is not nervous/anxious.       Objective:     Physical Exam  Vitals reviewed.   Constitutional:       Appearance: He is well-developed. He is obese.   Cardiovascular:      Pulses:           Dorsalis pedis pulses are 1+ on the right side and 1+ on the left side.   Musculoskeletal:         General: Tenderness (L great toe but only 'when hits it') and signs of injury present. No swelling. Normal range of motion.      Right lower leg: Edema present.      Left lower leg: Edema present.      Right foot: Deformity (hallux interphalangeus) present.      Left foot: Deformity present.        Feet:       Comments: Adequate joint range of  motion without pain, limitation, nor crepitation Bilateral feet and ankle joints. Muscle strength is 5/5 in all groups bilaterally.         Feet:      Right foot:      Skin integrity: Dry skin present. No blister, erythema or callus.      Left foot:      Skin integrity: Callus and dry skin present. No blister (sequelae L hallux digital tuft) or erythema.      Comments: Equinus B/L ankles w/ < 90 deg foot to leg noted w/ knees extended.      MS strength of extrinsics to foot & ankle B/L + 5/5 in DF/PF/Inv/Ev to resistance w/ no reproduction of pain in any direction.     Passive ROM of ankle & pedal joints is painless & w/out crepitation B/L.    Toenails 1st L, 2nd & 3rd R are hypertrophic, thickened, dystrophic, discolored.  Absent L 2nd toenail.     Skin:     General: Skin is cool and dry.      Capillary Refill: Capillary refill takes 2 to 3 seconds.      Findings: Lesion present. No bruising, ecchymosis or erythema.      Comments: Toes are cool to touch, feet are warm proximally BLE.    Decreased digital hair & sparse hair growth BK B/L.   Skin is dry & atrophic, slightly hyperpigmented, mildly edematous B/L.    Distal lateral 4th toe L w/ hyperkeratosis; underlying non-stageable ulcer.  Distal medial digital tuft L hallux w/ clavus & underlying ulcer unstageable.  Puncture wound healed w/ minimal eschar & no underlying abscess; no TTP plantar lateral 1st MPJ R.    Distal medial L hallux callus w/ minimal underlying macertion after debridement of hyperkeratosis; resolution of wound from HIPJ extending plantar & laterally w/ entire area dried. Hyperkeratosis over 2.4 x 1.8 cm; w/ debridement, ulcer 0.3 cm diameter w/ mild surrounding macerated skin. NSI.    Distal lateral 4th toe L foot w/ unstageable ulcer & hyperkeratosis measuring 1.9 x 1.1 cm w/out depth.    IPK sub 5th met.head L.    Toenails 1st L, 2nd R, 3rd, 4th & 5th  B/L are thickened, dystrophic, discolored, w/ crumbly subungual debris.   Absent 2nd L  toenail w/ dry nail bed.     Neurological:      Mental Status: He is alert and oriented to person, place, and time.      Sensory: Sensory deficit present.      Motor: No weakness or abnormal muscle tone.      Gait: Gait normal.      Comments: SWMF 5.07 testing is diminished B/L; sharp/dull sensation & light touch diminished.    Paresthesias including numbness & burning qhs B/L feet w/ no clearly identified trigger or source; no hyperemia.  No TTP tarsal tunnel B/L nor w/ palpation IMS B/L.   Psychiatric:         Mood and Affect: Mood and affect normal. Mood is not anxious.         Behavior: Behavior normal. Behavior is cooperative.     3/18/24      2/9/24      Assessment:      Encounter Diagnoses   Name Primary?    Puncture wound of left foot, initial encounter Yes    Amputated great toe, right     Corn or callus     DM type 2 with diabetic peripheral neuropathy     Chronic ulcer of great toe of left foot, unspecified ulcer stage     Ulcer of toe, left, limited to breakdown of skin     Pain due to onychomycosis of toenails of both feet      Problem List Items Addressed This Visit    None  Visit Diagnoses       Puncture wound of left foot, initial encounter    -  Primary    Amputated great toe, right        Corn or callus        Relevant Orders    Routine Foot Care    DM type 2 with diabetic peripheral neuropathy        Relevant Orders    Routine Foot Care    Chronic ulcer of great toe of left foot, unspecified ulcer stage        Relevant Orders    Wound Debridement L hallux & 4th toe    Ulcer of toe, left, limited to breakdown of skin        Relevant Orders    Wound Debridement L hallux & 4th toe    Pain due to onychomycosis of toenails of both feet        Relevant Orders    Routine Foot Care          Plan:     Russell was seen today for foot injury.    Diagnoses and all orders for this visit:    Puncture wound of left foot, initial encounter    Amputated great toe, right    Corn or callus  -     Routine Foot  Care    DM type 2 with diabetic peripheral neuropathy  -     Routine Foot Care    Chronic ulcer of great toe of left foot, unspecified ulcer stage  -     Wound Debridement L hallux & 4th toe    Ulcer of toe, left, limited to breakdown of skin  -     Wound Debridement L hallux & 4th toe    Pain due to onychomycosis of toenails of both feet  -     Routine Foot Care    I counseled the patient on his conditions, their implications & medical mgmt.    - Shoe inspection. Diabetic Foot Education. Patient reminded of the importance of good blood sugar control to help prevent podiatric complications of diabetes. Patient instructed on proper foot hygeine. We discussed wearing proper shoe gear, daily foot inspections, never walking w/out protective shoe gear, annual or twice annual DM foot exam, sooner prn.      Remaining callus & ulcer L distal hallux  & 4th toe was debrided.  Use bandaid qd after bathing until completely healed/ dried.    Continue Gabapentin 300 mg tid - has refills through 8/26/24    [facility] - advised that he needs to check that has R filler prosthesis for absent R great toe, & had Rx DM shoe written last visit 3/25/24 as well.  Continue gelstraps for MLA B/L.    W/ patient's permission,B/L toenails x 10 were aggressively reduced & debrided using a nail nipper, removing all offending nail and debris.   The patient will continue to monitor the areas daily, inspect the feet, wear protective shoe gear when ambulatory, & moisturizer to maintain skin integrity.    F/u 3 months for DM foot care, sooner prn.        A total of 31 mins.was spent on chart review, patient visit & documentation.

## 2024-06-21 NOTE — PROCEDURES
Routine Foot Care    Date/Time: 6/12/2024 3:45 PM    Performed by: Yanelis Ayon DPM  Authorized by: Yanelis Ayon DPM    Consent Done?:  Yes (Verbal)  Hyperkeratotic Skin Lesions?: Yes    Number of trimmed lesions:  1  Location(s):  Left 5th Metatarsal Head    Nail Care Type:  Debride(Left 1st Toe, Left 3rd Toe, Left 4th Toe, Right 3rd Toe, Right 2nd Toe, Left 5th Toe, Right 4th Toe and Right 5th Toe)  Patient tolerance:  Patient tolerated the procedure well with no immediate complications  Wound Debridement L hallux & 4th toe    Date/Time: 6/12/2024 3:45 PM    Performed by: Yanelis Ayon DPM  Authorized by: Yanelis Ayon DPM    Consent Done?:  Yes (Verbal)    Wound Details:    Location:  Left foot    Location:  Left 4th Toe    Type of Debridement:  Excisional       Length (cm):  2.4       Area (sq cm):  4.32       Width (cm):  1.8       Percent Debrided (%):  100       Depth (cm):  0.3       Total Area Debrided (sq cm):  4.32    Depth of debridement:  Epidermis/Dermis    Tissue debrided:  Epidermis and Dermis    Devitalized tissue debrided:  Callus and Necrotic/Eschar    Instruments:  Nippers  Bleeding:  None    Additional wounds:  1    2nd Wound Details:     Location:  Left foot    Location:  Left 4th Toe    Location:  Left 4th Toe    Type of Debridement:  Excisional       Length (cm):  1.9       Area (sq cm):  2.09       Width (cm):  1.1       Percent Debrided (%):  100       Depth (cm):  0       Total Area Debrided (sq cm):  2.09    Depth of debridement:  Epidermis/Dermis    Tissue debrided:  Epidermis and Dermis    Devitalized tissue debrided:  Callus    Instruments:  Nippers  Bleeding:  None  Patient tolerance:  Patient tolerated the procedure well with no immediate complications

## 2024-06-26 DIAGNOSIS — E11.9 TYPE 2 DIABETES MELLITUS WITHOUT COMPLICATION: ICD-10-CM

## 2024-07-15 ENCOUNTER — PATIENT MESSAGE (OUTPATIENT)
Dept: ADMINISTRATIVE | Facility: HOSPITAL | Age: 69
End: 2024-07-15
Payer: MEDICARE

## 2024-09-16 ENCOUNTER — OFFICE VISIT (OUTPATIENT)
Dept: PODIATRY | Facility: CLINIC | Age: 69
End: 2024-09-16
Payer: MEDICARE

## 2024-09-16 VITALS
SYSTOLIC BLOOD PRESSURE: 134 MMHG | HEART RATE: 84 BPM | WEIGHT: 261.44 LBS | DIASTOLIC BLOOD PRESSURE: 77 MMHG | BODY MASS INDEX: 31.84 KG/M2 | HEIGHT: 76 IN

## 2024-09-16 DIAGNOSIS — L84 CORN OR CALLUS: ICD-10-CM

## 2024-09-16 DIAGNOSIS — M79.2 NEUROPATHIC PAIN: ICD-10-CM

## 2024-09-16 DIAGNOSIS — E11.42 DM TYPE 2 WITH DIABETIC PERIPHERAL NEUROPATHY: ICD-10-CM

## 2024-09-16 DIAGNOSIS — S98.111A AMPUTATED GREAT TOE, RIGHT: ICD-10-CM

## 2024-09-16 DIAGNOSIS — L84 HD (HELOMA DURUM): ICD-10-CM

## 2024-09-16 DIAGNOSIS — I87.2 EDEMA OF BOTH LOWER EXTREMITIES DUE TO PERIPHERAL VENOUS INSUFFICIENCY: ICD-10-CM

## 2024-09-16 DIAGNOSIS — M79.89 LEFT LEG SWELLING: ICD-10-CM

## 2024-09-16 DIAGNOSIS — M79.675 GREAT TOE PAIN, LEFT: Primary | ICD-10-CM

## 2024-09-16 DIAGNOSIS — L84 CLAVUS: ICD-10-CM

## 2024-09-16 DIAGNOSIS — B35.1 ONYCHOMYCOSIS OF TOENAIL: ICD-10-CM

## 2024-09-16 PROCEDURE — 3008F BODY MASS INDEX DOCD: CPT | Mod: CPTII,S$GLB,, | Performed by: PODIATRIST

## 2024-09-16 PROCEDURE — 99999 PR PBB SHADOW E&M-EST. PATIENT-LVL III: CPT | Mod: PBBFAC,,, | Performed by: PODIATRIST

## 2024-09-16 PROCEDURE — 1159F MED LIST DOCD IN RCRD: CPT | Mod: CPTII,S$GLB,, | Performed by: PODIATRIST

## 2024-09-16 PROCEDURE — 99213 OFFICE O/P EST LOW 20 MIN: CPT | Mod: 25,S$GLB,, | Performed by: PODIATRIST

## 2024-09-16 PROCEDURE — 3075F SYST BP GE 130 - 139MM HG: CPT | Mod: CPTII,S$GLB,, | Performed by: PODIATRIST

## 2024-09-16 PROCEDURE — 11057 PARNG/CUTG B9 HYPRKR LES >4: CPT | Mod: GY,Q9,S$GLB, | Performed by: PODIATRIST

## 2024-09-16 PROCEDURE — 1125F AMNT PAIN NOTED PAIN PRSNT: CPT | Mod: CPTII,S$GLB,, | Performed by: PODIATRIST

## 2024-09-16 PROCEDURE — 11721 DEBRIDE NAIL 6 OR MORE: CPT | Mod: GY,Q9,59,S$GLB | Performed by: PODIATRIST

## 2024-09-16 PROCEDURE — 3078F DIAST BP <80 MM HG: CPT | Mod: CPTII,S$GLB,, | Performed by: PODIATRIST

## 2024-09-16 NOTE — PROGRESS NOTES
Subjective:     Patient ID: Russell Warner is a 68 y.o. male.    Chief Complaint: Diabetes, Nail Care, Joint Swelling (Left ankle/leg swllen), and Wound Care (Left great toe)    Patient c/o pain L great toe d/t distal hallux callus & HIPJ as well as middle toe. Also, LLE swelling - states d/t using weedeater L which gets onto legs as unable to wear tight rubber boots. States has pain from nerves; pain level 6/10. On Gabapentin.  6/12/24  Patient was added on to today's pm schedule.  He had stepped on a screw through his shoe & did not know it until put shoe back on & re-injured it; noticed bleeding. Had stepped on screw/ nail in his garage. Went to ED 5/7/24; states & given tetanus & pain med. EMR shows had Xrays, given tetanus vaccine; also PO & topical Abx. F/u w/ PCP for exam 5/14/24.  Here for DM foot care as well - last tx was 3/18/24.  Also, c/o back & hip pain so [facility] - was already on Gabapentin which makes him sleepy anyway so could rest.  4/30/24  Patient is here for wound check L foot, as well as nail care - nails debrided last visit. States has pain level 6/10 d/t neuropathy, back & knee pain, but helped by Gabapentin.  3/18/24  Patient was last here 6 wks.ago for wound check L foot. Patient states Gabapentin helps even w/ qhs sx; only time has some pain is if his BG is elevated when he forgets his Insulin. Having some balance problems d/t absent R hallux; still [facility] for [facility] shoes [facility] - has not has any inserts w/ prosthetic filler R 1st amp.though - will [facility] in that regard.  2/6/24  Patient presents for wound check. He continues to have pain level of 6/10 B/L unrelated to wound - uses a walking stick d/t back & knee pain. Has chronic pain, neuropathic pain & muscle spasms for which he takes Gabapentin & Lidocaine patches as well as Robaxin respectively per [facility]; needs refill until he can get to them again. Also sees IM & [facility] prn.  2/9/24  Patient is here for wound check L  foot. Having pain but not every night; pain level 6/10. Also needs nails cut as they are starting to hurt w/ shoe pressure. On feet a lot as takes care of his 93 y/o mother.  1/2/24  Patient presents new to this podiatry clinic w/ c/o blister on L big toe x months. States it has been hurting on & off recently; pain level 6/10.    was seen by Dr. Harris twice but was only for toenails & calluses that were trimmed w/ clippers. In the interim, had noted blister after being in a hot tub, w/ pus - went to ED (10/19/23) & given PO as well as topical Abx. Has not tried to self-treat as has had previous problem w/ L great toe leading to amp.  11/8/23 - Dr. Harris  Russell is a 68 y.o. male who presents to the clinic for evaluation and treatment of high risk feet. Russell has a past medical history of Arthritis, Diabetes mellitus, type 2, Hyperlipidemia, Hypertension, Prostate cancer, and Rotator cuff injury. The patient's chief complaint is long, thick toenails. This patient has documented high risk feet requiring routine maintenance secondary to diabetes mellitis and those secondary complications of diabetes, as mentioned..     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    PCP Leann Zavala MD10/30/23  & [facility] - appt.tomorrow 1/3/24    Past Medical History:   Diagnosis Date    Arthritis     Diabetes mellitus, type 2     Hyperlipidemia     Hypertension     Prostate cancer     radiation 2017 at [facility]    Rotator cuff injury      Patient Active Problem List   Diagnosis    Type 2 diabetes mellitus with mild nonproliferative retinopathy, with long-term current use of insulin    Essential hypertension    Erectile dysfunction associated with type 2 diabetes mellitus    Morbid (severe) obesity due to excess calories    Acquired absence of right great toe    Chronic back pain    Primary osteoarthritis of both knees    Right shoulder pain    Hip pain    Rotator cuff arthropathy of right shoulder    Lumbar spondylosis     Great toe pain, left    Elevated PSA    Diabetes mellitus due to underlying condition with stage 3 chronic kidney disease     [facility] 5/14/[facility] A1c 7.0 %  Hemoglobin A1C   Date Value Ref Range Status   10/16/2023 7.3 (A) 4.2 - 5.8 % Final   11/02/2022 6.3 (H) 4.0 - 5.6 % Final     Comment:     ADA Screening Guidelines:  5.7-6.4%  Consistent with prediabetes  >or=6.5%  Consistent with diabetes    High levels of fetal hemoglobin interfere with the HbA1C  assay. Heterozygous hemoglobin variants (HbS, HgC, etc)do  not significantly interfere with this assay.   However, presence of multiple variants may affect accuracy.     03/11/2021 10.1 (H) 4.0 - 5.6 % Final     Comment:     ADA Screening Guidelines:  5.7-6.4%  Consistent with prediabetes  >or=6.5%  Consistent with diabetes    High levels of fetal hemoglobin interfere with the HbA1C  assay. Heterozygous hemoglobin variants (HbS, HgC, etc)do  not significantly interfere with this assay.   However, presence of multiple variants may affect accuracy.       Review of Systems   Constitutional: Negative for malaise/fatigue.   Cardiovascular:  Negative for claudication and leg swelling.   Skin:  Positive for color change, dry skin, nail changes, poor wound healing, rash and suspicious lesions. Negative for itching.   Musculoskeletal:  Positive for arthritis, back pain, joint pain, muscle weakness, myalgias and neck pain. Negative for falls, joint swelling and muscle cramps.   Gastrointestinal:  Negative for nausea and vomiting.   Neurological:  Positive for loss of balance, numbness, paresthesias and sensory change. Negative for focal weakness and weakness.   Psychiatric/Behavioral:  The patient is not nervous/anxious.       Objective:     Physical Exam  Vitals reviewed.   Constitutional:       Appearance: He is well-developed. He is obese.   Cardiovascular:      Pulses:           Dorsalis pedis pulses are 1+ on the right side and 1+ on the left side.   Musculoskeletal:          General: Tenderness (L great toe but only 'when hits it') and signs of injury (LLE) present. No swelling. Normal range of motion.      Right lower leg: Edema present.      Left lower leg: Edema present.      Right foot: Deformity present.      Left foot: Deformity (hallux interphalangeus L) present.        Feet:       Comments: Adequate joint range of motion without pain, limitation, nor crepitation Bilateral feet and ankle joints. Muscle strength is 5/5 in all groups bilaterally.         Feet:      Right foot:      Skin integrity: Dry skin present. No blister, erythema or callus.      Left foot:      Skin integrity: Callus and dry skin present. No blister (sequelae L hallux digital tuft) or erythema.      Comments: Equinus B/L ankles w/ < 90 deg foot to leg noted w/ knees extended.      MS strength of extrinsics to foot & ankle B/L + 5/5 in DF/PF/Inv/Ev to resistance w/ no reproduction of pain in any direction.     Passive ROM of ankle & pedal joints is painless & w/out crepitation B/L.      Toenails 1st L, 2nd & 3rd R, 4th & 5th  B/L are thickened, dystrophic, discolored.   Absent L 2nd toenail.     Skin:     General: Skin is cool and dry.      Capillary Refill: Capillary refill takes 2 to 3 seconds.      Findings: Lesion present. No bruising, ecchymosis or erythema.      Comments: Toes are cool to touch, feet are warm proximally BLE.    Decreased digital hair & sparse hair growth BK B/L.   Skin is dry & atrophic, slightly hyperpigmented, mildly edematous B/L.    Distal lateral 4th toe L clavus only.  Distal medial digital tuft L hallux w/ clavus; no underlying ulcer remaining.    Puncture wound healed plantar lateral 1st MPJ R.    IPK sub 5th met.head L & medial HIPJ L.  Hd 3 PIPJ L.      Neurological:      Mental Status: He is alert and oriented to person, place, and time.      Sensory: Sensory deficit present.      Motor: No weakness or abnormal muscle tone.      Gait: Gait normal.      Comments: SWMF testing  is diminished B/L; sharp/dull sensation & light touch diminished.    Paresthesias including numbness & burning qhs B/L feet w/ no clearly identified trigger or source; no hyperemia.  No TTP tarsal tunnel B/L nor w/ palpation IMS B/L.   Psychiatric:         Mood and Affect: Mood and affect normal. Mood is not anxious.         Behavior: Behavior normal. Behavior is cooperative.     3/18/24      2/9/24      Assessment:      Encounter Diagnoses   Name Primary?    DM type 2 with diabetic peripheral neuropathy     Left leg swelling     Amputated great toe, right     Corn or callus     Great toe pain, left Yes    HD (heloma durum)     Clavus     Onychomycosis of toenail     Edema of both lower extremities due to peripheral venous insufficiency     Neuropathic pain        Problem List Items Addressed This Visit          Orthopedic    Great toe pain, left - Primary     Other Visit Diagnoses       DM type 2 with diabetic peripheral neuropathy        Relevant Medications    gabapentin (NEURONTIN) 300 MG capsule    Other Relevant Orders    Routine Foot Care    Left leg swelling        Amputated great toe, right        Corn or callus        Relevant Orders    Routine Foot Care    HD (heloma durum)        Relevant Orders    Routine Foot Care    Clavus        Relevant Orders    Routine Foot Care    Onychomycosis of toenail        Relevant Orders    Routine Foot Care    Edema of both lower extremities due to peripheral venous insufficiency        Neuropathic pain        Relevant Medications    gabapentin (NEURONTIN) 300 MG capsule            Plan:     Russell was seen today for diabetes, nail care, joint swelling and wound care.    Diagnoses and all orders for this visit:    Great toe pain, left    DM type 2 with diabetic peripheral neuropathy  -     Routine Foot Care  -     gabapentin (NEURONTIN) 300 MG capsule; Take 1 capsule (300 mg total) by mouth 3 (three) times daily.    Left leg swelling    Amputated great toe, right    Corn  or callus  -     Routine Foot Care    HD (heloma durum)  -     Routine Foot Care    Clavus  -     Routine Foot Care    Onychomycosis of toenail  -     Routine Foot Care    Edema of both lower extremities due to peripheral venous insufficiency    Neuropathic pain  -     gabapentin (NEURONTIN) 300 MG capsule; Take 1 capsule (300 mg total) by mouth 3 (three) times daily.      I counseled the patient on his conditions, their implications & medical mgmt.    - Shoe inspection. Diabetic Foot Education. Patient reminded of the importance of good blood sugar control to help prevent podiatric complications of diabetes. Patient instructed on proper foot hygeine. We discussed wearing proper shoe gear, daily foot inspections, never walking w/out protective shoe gear, annual or twice annual DM foot exam, sooner prn.      Continue Gabapentin 300 mg tid.    [facility] & last [facility]/25/24 visit here - advised that he needs R filler prosthesis for absent R great toe.    Continue gelstraps for MLA B/L.    W/ patient's permission,B/L toenails x 7 were aggressively reduced & debrided tot heir soft tissue attachment, using a nail nipper, removing all offending nail & debris.  Utilizing a tissue nipper & scalpel, I trimmed the corns & calluses at the above-mentioned locations.        F/u 3-4 months for DM foot care, sooner prn.        A total of 26 mins.was spent on chart review, patient visit & documentation.

## 2024-09-22 RX ORDER — GABAPENTIN 300 MG/1
300 CAPSULE ORAL 3 TIMES DAILY
Qty: 270 CAPSULE | Refills: 1 | Status: SHIPPED | OUTPATIENT
Start: 2024-09-22

## 2024-09-23 NOTE — PROCEDURES
Routine Foot Care    Date/Time: 9/16/2024 1:30 PM    Performed by: Yanelis Ayon DPM  Authorized by: Yanelis Ayon DPM    Consent Done?:  Yes (Verbal)  Hyperkeratotic Skin Lesions?: Yes    Number of trimmed lesions:  5  Location(s):  Left 4th Toe, Left 1st Toe, Left 5th Metatarsal Head and Left 3rd Toe    Nail Care Type:  Debride(Left 1st Toe, Right 2nd Toe, Right 3rd Toe, Left 4th Toe, Left 5th Toe, Right 4th Toe and Right 5th Toe)

## 2024-10-03 PROBLEM — E11.628 TYPE 2 DIABETES MELLITUS WITH LEFT DIABETIC FOOT INFECTION: Status: ACTIVE | Noted: 2024-10-03

## 2024-10-03 PROBLEM — M00.9 SEPTIC ARTHRITIS OF LEFT FOOT: Status: ACTIVE | Noted: 2024-10-03

## 2024-10-03 PROBLEM — L08.9 TYPE 2 DIABETES MELLITUS WITH LEFT DIABETIC FOOT INFECTION: Status: ACTIVE | Noted: 2024-10-03

## 2024-10-03 PROBLEM — M86.9 OSTEOMYELITIS OF LEFT FOOT: Status: ACTIVE | Noted: 2024-10-03

## 2024-10-04 PROBLEM — E11.42 DM TYPE 2 WITH DIABETIC PERIPHERAL NEUROPATHY: Status: ACTIVE | Noted: 2024-10-04

## 2024-10-04 PROBLEM — L03.116 CELLULITIS OF LEFT LEG: Status: ACTIVE | Noted: 2024-10-04

## 2024-10-07 PROBLEM — Z89.422: Status: ACTIVE | Noted: 2024-10-07

## 2024-10-07 PROBLEM — Z09 POSTOPERATIVE FOLLOW-UP: Status: ACTIVE | Noted: 2024-10-07

## 2024-10-07 PROBLEM — M00.9 SEPTIC ARTHRITIS OF LEFT FOOT: Status: RESOLVED | Noted: 2024-10-03 | Resolved: 2024-10-07

## 2024-10-07 PROBLEM — L03.116 CELLULITIS OF LEFT LEG: Status: RESOLVED | Noted: 2024-10-04 | Resolved: 2024-10-07

## 2024-10-10 ENCOUNTER — PATIENT OUTREACH (OUTPATIENT)
Dept: ADMINISTRATIVE | Facility: CLINIC | Age: 69
End: 2024-10-10
Payer: MEDICARE

## 2024-10-10 NOTE — PROGRESS NOTES
Subjective:     Patient ID: Russell Warner is a 69 y.o. male.    Chief Complaint: Post-op Evaluation    Patient is PO subtotal amp.4th toe L, D.O.S 10/04/2024. PO, patient's son had requested both his feet be checked each visit especially as patient is significantly neuropathic. POD 13 days.   Did have 1st dressing change 3 days PO on 10/7/24, prior to DC. +MSSA. On IV Cefazolin 2 g q 8 h 10/7-11/2/24. Missed dose of Abx last night & this am as not being delivered until today. Leukocytosis has resolved; both ESR & CRP remain elevated but are trending down as well.  He was seen as a consult while inpatient 10/3/24 w/ new onset of L foot swelling & wound 4th toe x 1 wk.; noticed by son who was/ is visiting from [place] for his birthday.  MRI showed septic arthritis PIPJ & DIPJ 4th toe w/ osteomyelitis including distal 1/2 of  proximal phalanx.  Last clinic visit was a month ago & the L great toe ulcer had healed.    10/7/24      Specimen to Pathology, Surgery General Surgery  Order: [id number]  Status: Final result       Visible to patient: Yes (not seen)       Next appt: 10/14/2024 at 01:00 PM in Internal Medicine (Leann Zavala MD)    0 Result Notes      Component 6 d ago   Final Pathologic Diagnosis 1. Left 4th toe, amputation:Toe with acute osteomyelitis and soft tissue abscess.   Comment: Interp By Aleksandar Horton M.D., Signed on 10/10/2024 at 12:25   Gross Patient ID/MRN:  [id number]  Pathology ID/MRN:  [id number]  In formalin, labeled &quot;4th toe left foot&quot;, is a 4.0 by 3.0 x 2.7 cm transphalangeal amputated toe.  The specimen is dry, necrotic.  The skin surface is scaly and has a gray brown coloration.  No toenail is identified.  On cut sections the bone  appears necrotic.  Representative sections that include soft tissue and bone are submitted in cassettes SBS--1-A-B following decalcification.    Abel PEREZ ([facility]) cm   Disclaimer Unless the case is a 'gross only' or additional testing only,  the final diagnosis for each specimen is based on a microscopic examination of appropriate tissue sections.   Resulting Agency [facility]              Narrative  Performed by: [employer].  Pre-op Diagnosis: Septic arthritis of left foot [M00.9]  Procedure(s):  AMPUTATION, TOE 4th  Number of specimens: 1  Name of specimens: 1-4th toe bone, left foot  Release to patient->Immediate  Specimen total (fresh, frozen, permanent):->1   Specimen Collected: 10/04/24 12:19 CDT Last Resulted: 10/10/24 12:54 CDT     Aerobic culture  Order: [id number]  Status: Final result       Visible to patient: Yes (not seen)       Next appt: 10/14/2024 at 01:00 PM in Internal Medicine (Leann Zavala MD)    Specimen Information: Toe, Left Foot; Wound   0 Result Notes      Component 6 d ago   Aerobic Bacterial Culture  Abnormal   STAPHYLOCOCCUS AUREUS  Few  Skin beth also present    Resulting Agency [facility]        Susceptibility     Staphylococcus aureus     CULTURE, AEROBIC  (SPECIFY SOURCE)     Clindamycin <=0.5 mcg/mL Sensitive     Erythromycin <=0.5 mcg/mL Sensitive     Oxacillin 0.5 mcg/mL Sensitive     Penicillin >8 mcg/mL Resistant     Tetracycline <=4 mcg/mL Sensitive     Trimeth/Sulfa <=0.5/9.5 m... Sensitive               Linear View        Specimen Collected: 10/04/24 12:19 CDT Last Resulted: 10/07/24 13:21 CDT    Culture, Anaerobe  Order: [id number]  Status: Final result       Visible to patient: Yes (not seen)       Next appt: 10/14/2024 at 01:00 PM in Internal Medicine (Leann Zavala MD)    Specimen Information: Toe, Left Foot; Wound   0 Result Notes      Component 6 d ago   Anaerobic Culture No anaerobes isolated   Resulting Agency [facility]             Specimen Collected: 10/04/24 12:19 CDT Last Resulted: 10/09/24 09:44 CDT       X-Ray Toe 2 or More Views Left  Order: [id number]  Status: Final result       Visible to patient: Yes (not seen)       Next appt: 10/30/2024 at 01:45 PM in Podiatry (Yanelis Ayon DPM)       Dx: Septic arthritis  of left foot, due to...    0 Result Notes  Details    Reading Physician Reading Date Result Priority   Darin Morales MD  [phone] 10/4/2024 Routine     Narrative & Impression  EXAMINATION:  XR TOE 2 OR MORE VIEWS LEFT     CLINICAL HISTORY:  postop;  Pyogenic arthritis, unspecified     TECHNIQUE:  Three views of the left toes were performed     COMPARISON:  10/03/2024     FINDINGS:  Interval amputation of the right 4th toe phalanges at the proximal phalanx base.  Similar cortical irregularity at the 1st through 3rd distal phalangeal jose with remote deformity of the distal 2nd metatarsal shaft and 2nd and 3rd metatarsal heads.  Chronic appearing abnormality at the great toe proximal phalanx distally at the IP joint and medial base, unchanged.  No new sathya osseous destruction.  Similar soft tissue prominence.     Impression:     As above.        Electronically signed by:Darin Morales  Date:                                            10/04/2024  Time:                                           15:54   I independently reviewed the x-ray images.  Other than area of recent subtotal amp.4th toe L, only noted chronic abnormality of great toe as well as 2nd & 3rd ray.    MRI Foot (Forefoot) Left W W/O Contrast  Order: [id number]  Status: Final result       Visible to patient: Yes (not seen)       Next appt: 10/30/2024 at 01:45 PM in Podiatry (Yanelis Ayon DPM)       Dx: Osteomyelitis of fourth toe of left foot    0 Result Notes  Details    Reading Physician Reading Date Result Priority   Waldo Cantu MD  [phone] 10/3/2024 STAT     Narrative & Impression  EXAMINATION:  MRI FOOT (FOREFOOT) LEFT W W/O CONTRAST     CLINICAL HISTORY:  Foot swelling, diabetic, osteomyelitis suspected, xray done;     TECHNIQUE:  Routine MRI evaluation of the left forefoot performed with and without the use of 10 cc of Gadavist IV contrast.     COMPARISON:  Radiographs 10/03/2024.     FINDINGS:  BONES: Confluent marrow edema  throughout the 4th proximal, middle and distal phalanges with corresponding T1 medullary hypointensity throughout the middle and distal phalanges and distal half of the proximal phalanx.  Associated osseous erosive changes of the middle and distal phalanges.  Chronic osseous erosive changes of the 1st distal phalanx tuft with associated T1 medullary hypointensity likely sequela chronic osteomyelitis.  Remote healed fracture of the distal aspect of the 2nd metatarsal shaft.  Flattening and subchondral plate irregularity involving the 2nd and 3rd metatarsal heads, likely sequela of chronic AVN with articular surface collapse.     JOINTS: 4th PIP and DIP joint effusions with associated enhancing synovitis.  Scattered tarsometatarsal, MTP and IP osteoarthritis.  No dislocation.     TENDONS: Marked attenuation of the 4th extensor and flexor tendons at the level of the DIP joints concerning for complete erosion/tears.  Remaining visualized flexor and extensor tendons are intact.     MUSCLES: Enhancing edema and moderate to severe fatty atrophy of the visualized musculature.  No intramuscular fluid collection.     MISCELLANEOUS: Enhancing subcutaneous edema throughout the 4th digit with ulceration at the level of the tuft.  Geographic area of soft tissue non enhancement surrounding the 5th digit and extending along the lateral aspect of the 5th MTP joint.  No rim enhancing fluid collection to suggest an abscess.     Impression:     1. Septic arthritis of the fourth PIP and DIP joints with associated osteomyelitis of the distal and middle phalanges and distal half of the proximal phalanx.  Surrounding cellulitis with distal ulceration.  No abscess.  2. Chronic osteomyelitis of the 1st distal phalanx.  3. Nonviable tissue surrounding the 5th digit and overlying the lateral aspect of the 5th MTP joint.        Electronically signed by:Waldo Cantu MD  Date:                                            10/03/2024  Time:                                            14:42     9/16/24  Patient c/o pain L great toe d/t distal hallux callus & HIPJ as well as middle toe. Also, LLE swelling - states d/t using weedeater L which gets onto legs as unable to wear tight rubber boots. States has pain from nerves; pain level 6/10. On Gabapentin.  6/12/24  Patient was added on to today's pm schedule.  He had stepped on a screw through his shoe & did not know it until put shoe back on & re-injured it; noticed bleeding. Had stepped on screw/ nail in his garage. Went to ED 5/7/24; states & given tetanus & pain med. EMR shows had Xrays, given tetanus vaccine; also PO & topical Abx. F/u w/ PCP for exam 5/14/24.  Here for DM foot care as well - last tx was 3/18/24.  Also, c/o back & hip pain so [facility] - was already on Gabapentin which makes him sleepy anyway so could rest.  4/30/24  Patient is here for wound check L foot, as well as nail care - nails debrided last visit. States has pain level 6/10 d/t neuropathy, back & knee pain, but helped by Gabapentin.  3/18/24  Patient was last here 6 wks.ago for wound check L foot. Patient states Gabapentin helps even w/ qhs sx; only time has some pain is if his BG is elevated when he forgets his Insulin. Having some balance problems d/t absent R hallux; still [facility] D[facility] - has not has any inserts w/ prosthetic filler R 1st amp.though - will contact [facility] in that regard.  2/6/24  Patient presents for wound check. He continues to have pain level of 6/10 B/L unrelated to wound - uses a walking stick d/t back & knee pain. Has chronic pain, neuropathic pain & muscle spasms for which he takes Gabapentin & Lidocaine patches as well as Robaxin respectively per [facility]; needs refill until he can get to them again. Also sees IM & [facility] prn.  2/9/24  Patient is here for wound check L foot. Having pain but not every night; pain level 6/10. Also needs nails cut as they are starting to hurt w/ shoe pressure.  On feet a lot as takes care of his 93 y/o mother.  1/2/24  Patient presents new to this podiatry clinic w/ c/o blister on L big toe x months. States it has been hurting on & off recently; pain level 6/10.    was seen by Dr. Harris twice but was only for toenails & calluses that were trimmed w/ clippers. In the interim, had noted blister after being in a hot tub, w/ pus - went to ED (10/19/23) & given PO as well as topical Abx. Has not tried to self-treat as has had previous problem w/ L great toe leading to amp.  11/8/23 - Dr. Harris  Russell is a 69 y.o. male who presents to the clinic for evaluation and treatment of high risk feet. Russell has a past medical history of Arthritis, Diabetes mellitus, type 2, Hyperlipidemia, Hypertension, Prostate cancer, and Rotator cuff injury. The patient's chief complaint is long, thick toenails. This patient has documented high risk feet requiring routine maintenance secondary to diabetes mellitis and those secondary complications of diabetes, as mentioned..     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    PCP Leann Zavala, MD10/14/24, due 11/14/24  & [facility] - 10/12/24    Past Medical History:   Diagnosis Date    Arthritis     Diabetes mellitus, type 2     Hyperlipidemia     Hypertension     Prostate cancer     radiation 2017 at [facility]    Rotator cuff injury      Patient Active Problem List   Diagnosis    Type 2 diabetes mellitus with mild nonproliferative retinopathy, with long-term current use of insulin    Essential hypertension    Erectile dysfunction associated with type 2 diabetes mellitus    Acquired absence of right great toe    Chronic back pain    Primary osteoarthritis of both knees    Right shoulder pain    Hip pain    Rotator cuff arthropathy of right shoulder    Lumbar spondylosis    Elevated PSA    Diabetes mellitus due to underlying condition with stage 3 chronic kidney disease    Osteomyelitis of fourth toe of left foot    Type 2 diabetes mellitus  with left diabetic foot infection    DM type 2 with diabetic peripheral neuropathy    Postoperative follow-up    History of partial ray amputation of fourth toe of left foot    Chronic kidney disease, stage 3a    Peripheral vascular disease, unspecified     [facility] 5/14/[facility] A1c 7.0 %  Hemoglobin A1C   Date Value Ref Range Status   10/03/2024 7.2 (H) 4.0 - 5.6 % Final     Comment:     ADA Screening Guidelines:  5.7-6.4%  Consistent with prediabetes  >or=6.5%  Consistent with diabetes    High levels of fetal hemoglobin interfere with the HbA1C  assay. Heterozygous hemoglobin variants (HbS, HgC, etc)do  not significantly interfere with this assay.   However, presence of multiple variants may affect accuracy.     10/16/2023 7.3 (A) 4.2 - 5.8 % Final   11/02/2022 6.3 (H) 4.0 - 5.6 % Final     Comment:     ADA Screening Guidelines:  5.7-6.4%  Consistent with prediabetes  >or=6.5%  Consistent with diabetes    High levels of fetal hemoglobin interfere with the HbA1C  assay. Heterozygous hemoglobin variants (HbS, HgC, etc)do  not significantly interfere with this assay.   However, presence of multiple variants may affect accuracy.       Review of Systems   Constitutional: Negative for malaise/fatigue.   Cardiovascular:  Negative for claudication and leg swelling.   Skin:  Positive for color change, dry skin, nail changes, poor wound healing, rash and suspicious lesions. Negative for itching.   Musculoskeletal:  Positive for arthritis, back pain, joint pain, muscle weakness, myalgias and neck pain. Negative for falls, joint swelling and muscle cramps.   Gastrointestinal:  Negative for nausea and vomiting.   Neurological:  Positive for loss of balance, numbness, paresthesias and sensory change. Negative for focal weakness and weakness.   Psychiatric/Behavioral:  The patient is not nervous/anxious.       Objective:     Physical Exam  Vitals reviewed.   Constitutional:       Appearance: He is well-developed. He is obese.    Cardiovascular:      Pulses:           Dorsalis pedis pulses are 1+ on the right side and 1+ on the left side.   Musculoskeletal:         General: Tenderness (L great toe but only 'when hits it') and signs of injury (LLE - weedeater about week PTA 9/16/24) present. No swelling. Normal range of motion.      Right lower leg: Edema present.      Left lower leg: Edema present.      Right foot: Deformity (significant hammertoes B/L R>L) present.      Left foot: Deformity (hallux interphalangeus L) present.        Feet:       Comments: Adequate joint range of motion without pain, limitation, nor crepitation Bilateral feet and ankle joints. Muscle strength is 5/5 in all groups bilaterally.         Feet:      Right foot:      Skin integrity: Dry skin present. No ulcer, blister, erythema, warmth or callus.      Toenail Condition: Fungal disease present.     Left foot:      Skin integrity: Ulcer, callus and dry skin present. No blister (sequelae L hallux digital tuft), erythema or warmth.      Toenail Condition: Fungal disease present.     Comments: Equinus B/L ankles w/ < 90 deg foot to leg noted w/ knees extended.      MS strength of extrinsics to foot & ankle B/L + 5/5 in DF/PF/Inv/Ev to resistance w/ no reproduction of pain in any direction.     Passive ROM of ankle & pedal joints is painless & w/out crepitation B/L.    Toenails 1st L, 2nd, 3rd & 4th R, & 5th  B/L are thickened, dystrophic, discolored.   Absent L 2nd toenail & absent toe 4th L..     Skin:     General: Skin is cool and dry.      Capillary Refill: Capillary refill takes 2 to 3 seconds.      Findings: Lesion present. No bruising, ecchymosis or erythema.      Comments: Toes are cool to touch, feet are warm proximally BLE.  Decreased digital hair & sparse hair growth BK B/L.   Skin is dry & atrophic, slightly hyperpigmented, mildly edematous B/L.    Amputation stump 4th L w/ sutures intact; NSI, no erythema nor edema.    Distal medial digital tuft L hallux  w/ clavus; no underlying fluctuance palpable. However, w/ debridement, retained abscess w/ serosanguinous drainage obtained; no malodor.  Ulceration full thickness to subcut.& measured 0.6 cm diameter & 0.5 cm depth; did not appear to probe to bone (Xrays have shown chronic changes to both phalanges).    Maceration web spaces 4th B/L (L medial base of 5th toe) w/out erythema.    Amp.R great toe w/out overlying skin abnormality.    Puncture wound healed plantar lateral 1st MPJ R.    IPK sub 5th met.head L & medial HIPJ L.  hd 3 PIPJ L.      Neurological:      Mental Status: He is alert and oriented to person, place, and time.      Sensory: Sensory deficit present.      Motor: No weakness or abnormal muscle tone.      Gait: Gait normal.      Comments: SWMF testing is diminished B/L; sharp/dull sensation & light touch diminished.    Paresthesias including numbness & burning qhs B/L feet w/ no clearly identified trigger or source; no hyperemia.  No TTP tarsal tunnel B/L nor w/ palpation IMS B/L.   Psychiatric:         Mood and Affect: Mood and affect normal. Mood is not anxious.         Behavior: Behavior normal. Behavior is cooperative.     3/18/24      2/9/24      Assessment:      Encounter Diagnoses   Name Primary?    Septic arthritis of interphalangeal joint of toe of left foot     Postoperative follow-up Yes    DM type 2 with diabetic peripheral neuropathy     Chronic ulcer of great toe of left foot, unspecified ulcer stage     Abscess of great toe, left     Osteomyelitis of fourth toe of left foot     Amputated great toe, right     S/P amputation of lesser toe, left      Problem List Items Addressed This Visit          ID    Osteomyelitis of fourth toe of left foot       Endocrine    DM type 2 with diabetic peripheral neuropathy       Other    Postoperative follow-up - Primary     Other Visit Diagnoses       Septic arthritis of interphalangeal joint of toe of left foot        Chronic ulcer of great toe of left  foot, unspecified ulcer stage        Relevant Orders    SUBSEQUENT HOME HEALTH ORDERS    Incision and Drainage L great toe    Abscess of great toe, left        Relevant Orders    SUBSEQUENT HOME HEALTH ORDERS    Incision and Drainage L great toe    Amputated great toe, right        S/P amputation of lesser toe, left        Relevant Orders    SUBSEQUENT HOME HEALTH ORDERS          Plan:     Russell was seen today for post-op evaluation.    Diagnoses and all orders for this visit:    Postoperative follow-up    Septic arthritis of interphalangeal joint of toe of left foot    DM type 2 with diabetic peripheral neuropathy    Chronic ulcer of great toe of left foot, unspecified ulcer stage  -     SUBSEQUENT HOME HEALTH ORDERS  -     Incision and Drainage L great toe    Abscess of great toe, left  -     SUBSEQUENT HOME HEALTH ORDERS  -     Incision and Drainage L great toe    Osteomyelitis of fourth toe of left foot    Amputated great toe, right    S/P amputation of lesser toe, left  -     SUBSEQUENT HOME HEALTH ORDERS    I counseled the patient on his conditions, their implications & medical mgmt.    - Shoe inspection. Diabetic Foot Education. Patient reminded of the importance of good blood sugar control to help prevent podiatric complications of diabetes. Patient instructed on proper foot hygeine. We discussed wearing proper shoe gear, daily foot inspections, never walking w/out protective shoe gear, annual or twice annual DM foot exam, sooner prn.      Continue Gabapentin 300 mg tid.    [facility] & [facility] visit here - Rx for R filler prosthesis for absent R great toe.    Postop dressing change 4th ray. TO be kept CDI until next clinic visit.  F/u possible suture removal L 4th toe amp.site in 1-2 wks.    Gentian violet between 4th webspaces B/L.    Debridement 2/ I&D distal L great toe. Dressing including Betadine-soaked 2x2 wick, 4x4 digital splints & Coban.  To continue w/ wet-dry Betadine dressing  changes qd or qod, or as needed if soiled.  Wound check R hallux.    F/u 3 months for DM foot care as scheduled 1/6/24, sooner prn.        A total of 39  mins.was spent on chart review, patient visit & documentation.

## 2024-10-10 NOTE — PROGRESS NOTES
2nd Attempt made to reach patient for TCC call. Left voicemail please call [phone] leave first name, last name, and  for Carlos I will return your call.

## 2024-10-10 NOTE — PROGRESS NOTES
C3 nurse attempted to contact Russell Warner  for a TCC post hospital discharge follow up call. No answer. Left voicemail with callback information. The patient has a scheduled HOSFU appointment with Leann Zavala MD  on 10/14/24 @ 1300.

## 2024-10-11 NOTE — PROGRESS NOTES
3rd Attempt made to reach patient for TCC call. Left voicemail please call [phone] leave first name, last name, and  for Carlos I will return your call.

## 2024-10-14 ENCOUNTER — OFFICE VISIT (OUTPATIENT)
Dept: INTERNAL MEDICINE | Facility: CLINIC | Age: 69
End: 2024-10-14
Payer: MEDICARE

## 2024-10-14 VITALS
DIASTOLIC BLOOD PRESSURE: 68 MMHG | HEART RATE: 85 BPM | BODY MASS INDEX: 32.8 KG/M2 | WEIGHT: 269.38 LBS | HEIGHT: 76 IN | OXYGEN SATURATION: 98 % | SYSTOLIC BLOOD PRESSURE: 128 MMHG

## 2024-10-14 DIAGNOSIS — N18.31 CHRONIC KIDNEY DISEASE, STAGE 3A: ICD-10-CM

## 2024-10-14 DIAGNOSIS — N18.30 DIABETES MELLITUS DUE TO UNDERLYING CONDITION WITH STAGE 3 CHRONIC KIDNEY DISEASE, UNSPECIFIED WHETHER LONG TERM INSULIN USE, UNSPECIFIED WHETHER STAGE 3A OR 3B CKD: ICD-10-CM

## 2024-10-14 DIAGNOSIS — M86.9 OSTEOMYELITIS, UNSPECIFIED SITE, UNSPECIFIED TYPE: ICD-10-CM

## 2024-10-14 DIAGNOSIS — I10 ESSENTIAL HYPERTENSION: ICD-10-CM

## 2024-10-14 DIAGNOSIS — D64.9 ANEMIA, UNSPECIFIED TYPE: Primary | ICD-10-CM

## 2024-10-14 DIAGNOSIS — I73.9 PERIPHERAL VASCULAR DISEASE, UNSPECIFIED: ICD-10-CM

## 2024-10-14 DIAGNOSIS — E08.22 DIABETES MELLITUS DUE TO UNDERLYING CONDITION WITH STAGE 3 CHRONIC KIDNEY DISEASE, UNSPECIFIED WHETHER LONG TERM INSULIN USE, UNSPECIFIED WHETHER STAGE 3A OR 3B CKD: ICD-10-CM

## 2024-10-14 PROBLEM — E66.01 MORBID (SEVERE) OBESITY DUE TO EXCESS CALORIES: Status: RESOLVED | Noted: 2021-03-11 | Resolved: 2024-10-14

## 2024-10-14 PROBLEM — M79.675 GREAT TOE PAIN, LEFT: Status: RESOLVED | Noted: 2023-10-19 | Resolved: 2024-10-14

## 2024-10-14 PROCEDURE — 99999 PR PBB SHADOW E&M-EST. PATIENT-LVL IV: CPT | Mod: PBBFAC,,, | Performed by: INTERNAL MEDICINE

## 2024-10-14 NOTE — PROGRESS NOTES
Subjective     Patient ID: Russell Warner is a 69 y.o. male.    Chief Complaint: Follow-up    HPI  Son, from [facility], is in attendance.    10/4- amputation L 4th toe.  Septic arthritis with osteomyelitis.  Wound grew out S aureus.    DM is controlled.   A1c 7.3      He is being treated with Cefazolin  via pic line.      No fever since discharge.  Swelling LLE has improved, and involves lower leg only now.     Neg doppler US .       Vasc studies neg for significant obstruction or stenosis          is following.          Labs reviewed -  He was made aware of low albumin.     Not as hungry.  Son has been trying to get him on a more regular eating schedule.      He is not monitoring sugars.    [facility], but is not using it, due to adherence problems.      CKD 3 a.    Htn, controlled with losartan.    We discussed difficulties of dual PCP 's - [facility] doc and [facility].     [facility].      Importance of communication between medical providers reviewed.    He has a lot of questions about elderly mother's health.  She has had a couple of admission to [facility] and is now enrolled in hospice, a decision which he is not totally accepting.    He spends a lot of energy caring for her, and not enough on his own self-care.  Review of Systems   Constitutional:  Negative for activity change and unexpected weight change.   Respiratory:  Negative for chest tightness and shortness of breath.    Cardiovascular:  Positive for leg swelling. Negative for chest pain.   Gastrointestinal:  Negative for abdominal pain.   Neurological:  Negative for headaches.   Psychiatric/Behavioral:  Negative for dysphoric mood.           Objective     Physical Exam  Constitutional:       General: He is not in acute distress.     Appearance: He is well-developed. He is not ill-appearing, toxic-appearing or diaphoretic.   Eyes:      General: No scleral icterus.  Neck:      Thyroid: No thyromegaly.      Vascular: No JVD.    Cardiovascular:      Rate and Rhythm: Normal rate and regular rhythm.      Heart sounds: Normal heart sounds.   Pulmonary:      Effort: Pulmonary effort is normal. No respiratory distress.      Breath sounds: Normal breath sounds. No stridor. No wheezing, rhonchi or rales.   Abdominal:      General: There is no distension.      Palpations: Abdomen is soft. There is no mass.      Tenderness: There is no abdominal tenderness. There is no guarding or rebound.      Hernia: No hernia is present.   Musculoskeletal:      Right lower leg: No edema.      Left lower leg: Edema (mild, distal LLE.) present.      Comments: L foot in boot.   Neurological:      Mental Status: He is alert and oriented to person, place, and time.      Cranial Nerves: No cranial nerve deficit.   Psychiatric:         Mood and Affect: Mood normal.         Behavior: Behavior normal.         Thought Content: Thought content normal.         Judgment: Judgment normal.            Assessment and Plan     1. Anemia, unspecified type  -     Iron and TIBC; Future; Expected date: 10/14/2024  -     Ferritin; Future; Expected date: 10/14/2024    2. Osteomyelitis, unspecified site, unspecified type  -     Cancel: Ankle Brachial Indices (CARIE); Future  -     C-REACTIVE PROTEIN; Future; Expected date: 10/14/2024  -     Sedimentation rate; Future; Expected date: 10/14/2024    3. Chronic kidney disease, stage 3a    4. Peripheral vascular disease, unspecified    5. Diabetes mellitus due to underlying condition with stage 3 chronic kidney disease, unspecified whether long term insulin use, unspecified whether stage 3a or 3b CKD    6. Essential hypertension                 Follow up in about 4 weeks (around 11/11/2024).    Monitor glucoses.   Wear dexcom  Flu, covid rec'd;  Rest, leg elevation  Cc Dr Ayon.  Labs 1 mo.

## 2024-10-15 ENCOUNTER — HOSPITAL ENCOUNTER (EMERGENCY)
Facility: HOSPITAL | Age: 69
Discharge: HOME | End: 2024-10-15
Attending: EMERGENCY MEDICINE
Payer: MEDICARE

## 2024-10-15 VITALS
SYSTOLIC BLOOD PRESSURE: 126 MMHG | HEIGHT: 76 IN | RESPIRATION RATE: 21 BRPM | HEART RATE: 85 BPM | WEIGHT: 269 LBS | OXYGEN SATURATION: 97 % | BODY MASS INDEX: 32.76 KG/M2 | TEMPERATURE: 98 F | DIASTOLIC BLOOD PRESSURE: 65 MMHG

## 2024-10-15 DIAGNOSIS — L08.9 FOOT INFECTION: ICD-10-CM

## 2024-10-15 DIAGNOSIS — T82.524A DISPLACEMENT OF PERIPHERALLY INSERTED CENTRAL VENOUS CATHETER (PICC): Primary | ICD-10-CM

## 2024-10-15 LAB
ALBUMIN SERPL BCP-MCNC: 3.2 G/DL (ref 3.5–5.2)
ALP SERPL-CCNC: 77 U/L (ref 55–135)
ALT SERPL W/O P-5'-P-CCNC: 8 U/L (ref 10–44)
ANION GAP SERPL CALC-SCNC: 11 MMOL/L (ref 8–16)
AST SERPL-CCNC: 28 U/L (ref 10–40)
BASOPHILS # BLD AUTO: 0.06 K/UL (ref 0–0.2)
BASOPHILS NFR BLD: 0.6 % (ref 0–1.9)
BILIRUB SERPL-MCNC: 0.3 MG/DL (ref 0.1–1)
BUN SERPL-MCNC: 21 MG/DL (ref 8–23)
CALCIUM SERPL-MCNC: 10 MG/DL (ref 8.7–10.5)
CHLORIDE SERPL-SCNC: 104 MMOL/L (ref 95–110)
CO2 SERPL-SCNC: 25 MMOL/L (ref 23–29)
CREAT SERPL-MCNC: 2.4 MG/DL (ref 0.5–1.4)
DIFFERENTIAL METHOD BLD: ABNORMAL
EOSINOPHIL # BLD AUTO: 0.7 K/UL (ref 0–0.5)
EOSINOPHIL NFR BLD: 7.9 % (ref 0–8)
ERYTHROCYTE [DISTWIDTH] IN BLOOD BY AUTOMATED COUNT: 15.4 % (ref 11.5–14.5)
EST. GFR  (NO RACE VARIABLE): 28.3 ML/MIN/1.73 M^2
GLUCOSE SERPL-MCNC: 197 MG/DL (ref 70–110)
HCT VFR BLD AUTO: 31.7 % (ref 40–54)
HGB BLD-MCNC: 10 G/DL (ref 14–18)
IMM GRANULOCYTES # BLD AUTO: 0.05 K/UL (ref 0–0.04)
IMM GRANULOCYTES NFR BLD AUTO: 0.5 % (ref 0–0.5)
LYMPHOCYTES # BLD AUTO: 1.5 K/UL (ref 1–4.8)
LYMPHOCYTES NFR BLD: 16.4 % (ref 18–48)
MCH RBC QN AUTO: 23.5 PG (ref 27–31)
MCHC RBC AUTO-ENTMCNC: 31.5 G/DL (ref 32–36)
MCV RBC AUTO: 75 FL (ref 82–98)
MONOCYTES # BLD AUTO: 0.8 K/UL (ref 0.3–1)
MONOCYTES NFR BLD: 8.4 % (ref 4–15)
NEUTROPHILS # BLD AUTO: 6.1 K/UL (ref 1.8–7.7)
NEUTROPHILS NFR BLD: 66.2 % (ref 38–73)
NRBC BLD-RTO: 0 /100 WBC
PLATELET # BLD AUTO: 343 K/UL (ref 150–450)
PMV BLD AUTO: 10.6 FL (ref 9.2–12.9)
POTASSIUM SERPL-SCNC: 4.2 MMOL/L (ref 3.5–5.1)
PROT SERPL-MCNC: 8.8 G/DL (ref 6–8.4)
RBC # BLD AUTO: 4.25 M/UL (ref 4.6–6.2)
SODIUM SERPL-SCNC: 140 MMOL/L (ref 136–145)
WBC # BLD AUTO: 9.28 K/UL (ref 3.9–12.7)

## 2024-10-15 PROCEDURE — C1751 CATH, INF, PER/CENT/MIDLINE: HCPCS

## 2024-10-15 PROCEDURE — 99284 EMERGENCY DEPT VISIT MOD MDM: CPT

## 2024-10-15 PROCEDURE — 36569 INSJ PICC 5 YR+ W/O IMAGING: CPT

## 2024-10-15 PROCEDURE — 85025 COMPLETE CBC W/AUTO DIFF WBC: CPT | Mod: 91 | Performed by: STUDENT IN AN ORGANIZED HEALTH CARE EDUCATION/TRAINING PROGRAM

## 2024-10-15 PROCEDURE — 80053 COMPREHEN METABOLIC PANEL: CPT | Mod: 91 | Performed by: STUDENT IN AN ORGANIZED HEALTH CARE EDUCATION/TRAINING PROGRAM

## 2024-10-15 RX ORDER — SODIUM CHLORIDE 0.9 % (FLUSH) 0.9 %
10 SYRINGE (ML) INJECTION EVERY 6 HOURS
Status: DISCONTINUED | OUTPATIENT
Start: 2024-10-16 | End: 2024-10-15 | Stop reason: HOSPADM

## 2024-10-15 RX ORDER — SODIUM CHLORIDE 0.9 % (FLUSH) 0.9 %
10 SYRINGE (ML) INJECTION
Status: DISCONTINUED | OUTPATIENT
Start: 2024-10-15 | End: 2024-10-15 | Stop reason: HOSPADM

## 2024-10-16 ENCOUNTER — PATIENT OUTREACH (OUTPATIENT)
Dept: EMERGENCY MEDICINE | Facility: HOSPITAL | Age: 69
End: 2024-10-16
Payer: MEDICARE

## 2024-10-16 NOTE — FIRST PROVIDER EVALUATION
"Medical screening examination initiated.  I have conducted a focused provider triage encounter, findings are as follows:    Brief history of present illness:      PICC line fell out, on chronic abx for osteo    Vitals:    10/15/24 2024   BP: 131/67   Pulse: 93   Resp: 20   Temp: 98.3 °F (36.8 °C)   SpO2: 100%   Weight: 122.2 kg (269 lb 6.4 oz)   Height: 6' 4" (1.93 m)       Pertinent physical exam:  A&Ox3    Brief workup plan:  Labs, overnight PICC team called    Preliminary workup initiated; this workup will be continued and followed by the physician or advanced practice provider that is assigned to the patient when roomed.  "

## 2024-10-16 NOTE — ED PROVIDER NOTES
Encounter Date: 10/15/2024       History     Chief Complaint   Patient presents with    IV Medication     States in on home IV antibiotics and the PICC line came out today and he has missed his 2 doses today     69 male PMH of HTN, DM2 w/ diabetic foot ulcers and recent OM s/p left 4th digit amputation. Pt was discharged after surgery w/ instructions to complete 4 week course of IV ancef 2G Q8 with PICC line in place. Today he is presenting back to the ED, after his PICC line was dislodged this morning. He missed one dose of his IV ancef at 2pm today. Otherwise he has no new sx and no fevers, chills, increased swelling or drainage from his surgical site. His foot is still wrapped w/ bandage in place, and he has scheduled follow up appt with Podiatry in 2 days.     The history is provided by the patient. No  was used.     Review of patient's allergies indicates:  No Known Allergies  Past Medical History:   Diagnosis Date    Arthritis     Diabetes mellitus, type 2     Hyperlipidemia     Hypertension     Prostate cancer     radiation 2017 at [facility]    Rotator cuff injury      Past Surgical History:   Procedure Laterality Date    EYE SURGERY      LUNG BIOPSY      SHOULDER SURGERY Right     TOE AMPUTATION Left 10/04/2024    4th toe    TOE AMPUTATION Left 10/4/2024    Procedure: AMPUTATION, TOE 4th;  Surgeon: Yanelis Ayon DPM;  Location: [facility] OR;  Service: Podiatry;  Laterality: Left;     Family History   Problem Relation Name Age of Onset    No Known Problems Sister       Social History     Tobacco Use    Smoking status: Never    Smokeless tobacco: Never   Substance Use Topics    Alcohol use: Yes     Comment: rarely - every 6 mo at most.    Drug use: Never     Review of Systems    Physical Exam     Initial Vitals [10/15/24 2024]   BP Pulse Resp Temp SpO2   131/67 93 20 98.3 °F (36.8 °C) 100 %      MAP       --         Physical Exam    Constitutional: He appears well-developed and well-nourished.    HENT:   Head: Normocephalic and atraumatic.   Eyes: EOM are normal. Pupils are equal, round, and reactive to light.   Cardiovascular:  Normal rate and regular rhythm.           Pulmonary/Chest: Breath sounds normal.   Abdominal: Abdomen is soft. Bowel sounds are normal.   Musculoskeletal:      Comments: -left foot wrapped w/ bandage still in place to cover surgical site     Skin: Skin is warm and dry.         ED Course   Procedures  Labs Reviewed   CBC W/ AUTO DIFFERENTIAL - Abnormal       Result Value    WBC 9.28      RBC 4.25 (*)     Hemoglobin 10.0 (*)     Hematocrit 31.7 (*)     MCV 75 (*)     MCH 23.5 (*)     MCHC 31.5 (*)     RDW 15.4 (*)     Platelets 343      MPV 10.6      Immature Granulocytes 0.5      Gran # (ANC) 6.1      Immature Grans (Abs) 0.05 (*)     Lymph # 1.5      Mono # 0.8      Eos # 0.7 (*)     Baso # 0.06      nRBC 0      Gran % 66.2      Lymph % 16.4 (*)     Mono % 8.4      Eosinophil % 7.9      Basophil % 0.6      Differential Method Automated     COMPREHENSIVE METABOLIC PANEL - Abnormal    Sodium 140      Potassium 4.2      Chloride 104      CO2 25      Glucose 197 (*)     BUN 21      Creatinine 2.4 (*)     Calcium 10.0      Total Protein 8.8 (*)     Albumin 3.2 (*)     Total Bilirubin 0.3      Alkaline Phosphatase 77      AST 28      ALT 8 (*)     eGFR 28.3 (*)     Anion Gap 11            Imaging Results              X-Ray Chest 1 View for Line/Tube Placement without Guidance (In process)                      Medications   sodium chloride 0.9% flush 10 mL (has no administration in time range)     And   sodium chloride 0.9% flush 10 mL (has no administration in time range)     Medical Decision Making  69 male PMH of HTN, DM2, and recent amputation of left fourth toe for OM 2/2 diabetic foot ulcers and PAD who presents to ED after his PICC line for IV abx became dislodged this morning. Pt stating that he has no other new or worsening sx and his post-op pain is controlled well. He has  follow up scheduled w/ Podiatry in 2 days.     His PICC line was replaced and placement confirmed on XR. He was discharged home w/ instructions to resume his abx at tonight's dose and then as scheduled beginning tomorrow AM. He was also instructed to let podiatry know that he missed a dose when he gets to his f/u appt in 2 days so any adjustments can be made if necessary.    Amount and/or Complexity of Data Reviewed  Labs: ordered.  Radiology: ordered and independent interpretation performed.     Details: PICC line properly in place in R arm, terminating at SVC    Risk  Prescription drug management.                                      Clinical Impression:  Final diagnoses:  [L08.9] Foot infection  [T82.524A] Displacement of peripherally inserted central venous catheter (PICC) (Primary)          ED Disposition Condition    Discharge Stable          ED Prescriptions    None       Follow-up Information       Follow up With Specialties Details Why Contact Info    Leann Zavala MD Internal Medicine Schedule an appointment as soon as possible for a visit in 3 days  [id number]  [place] [address]  [phone]               Orlando Belle MD  Resident  10/15/24 0416

## 2024-10-16 NOTE — PROCEDURES
"Russell Warner is a 69 y.o. male patient.    Temp: 98.3 °F (36.8 °C) (10/15/24 2024)  Pulse: 93 (10/15/24 2024)  Resp: 20 (10/15/24 2024)  BP: 131/67 (10/15/24 2024)  SpO2: 100 % (10/15/24 2024)  Weight: 122.2 kg (269 lb 6.4 oz) (10/15/24 2024)  Height: 6' 4" (193 cm) (10/15/24 2024)    PICC  Date/Time: 10/15/2024 9:00 PM  Performed by: Piyush Whitley RN  Consent Done: Yes  Time out: Immediately prior to procedure a time out was called to verify the correct patient, procedure, equipment, support staff and site/side marked as required  Indications: med administration and vascular access  Anesthesia: local infiltration  Local anesthetic: lidocaine 1% without epinephrine  Anesthetic Total (mL): 5  Preparation: skin prepped with ChloraPrep  Skin prep agent dried: skin prep agent completely dried prior to procedure  Sterile barriers: all five maximum sterile barriers used - cap, mask, sterile gown, sterile gloves, and large sterile sheet  Hand hygiene: hand hygiene performed prior to central venous catheter insertion  Location details: right basilic  Catheter type: double lumen  Catheter size: 5 Fr  Catheter Length: 48cm    Ultrasound guidance: yes  Vessel Caliber: large, compressibility normal  Needle advanced into vessel with real time Ultrasound guidance.  Guidewire confirmed in vessel.  Sterile sheath used.  Number of attempts: 1  Post-procedure: chlorhexidine patch, blood return through all ports and sterile dressing applied            Name piyush whitley  10/15/2024    " Male

## 2024-10-16 NOTE — ED TRIAGE NOTES
Russell Warner, a 69 y.o. male presents to the ED following his midline coming out. He is receiving IV antibiotics following left foot surgery, post op appt this week. Pt has no other complaints. Pt is AAOx4, Gcs 15.     Triage note:  Chief Complaint   Patient presents with    IV Medication     States in on home IV antibiotics and the PICC line came out today and he has missed his 2 doses today     Review of patient's allergies indicates:  No Known Allergies  Past Medical History:   Diagnosis Date    Arthritis     Diabetes mellitus, type 2     Hyperlipidemia     Hypertension     Prostate cancer     radiation 2017 at [facility]    Rotator cuff injury

## 2024-10-17 ENCOUNTER — OFFICE VISIT (OUTPATIENT)
Dept: PODIATRY | Facility: CLINIC | Age: 69
End: 2024-10-17
Payer: MEDICARE

## 2024-10-17 VITALS
WEIGHT: 269.38 LBS | HEART RATE: 74 BPM | DIASTOLIC BLOOD PRESSURE: 76 MMHG | SYSTOLIC BLOOD PRESSURE: 143 MMHG | BODY MASS INDEX: 32.79 KG/M2

## 2024-10-17 DIAGNOSIS — L02.612 ABSCESS OF GREAT TOE, LEFT: ICD-10-CM

## 2024-10-17 DIAGNOSIS — S98.111A AMPUTATED GREAT TOE, RIGHT: ICD-10-CM

## 2024-10-17 DIAGNOSIS — L97.529 CHRONIC ULCER OF GREAT TOE OF LEFT FOOT, UNSPECIFIED ULCER STAGE: ICD-10-CM

## 2024-10-17 DIAGNOSIS — Z89.422 S/P AMPUTATION OF LESSER TOE, LEFT: ICD-10-CM

## 2024-10-17 DIAGNOSIS — M86.9 OSTEOMYELITIS OF FOURTH TOE OF LEFT FOOT: ICD-10-CM

## 2024-10-17 DIAGNOSIS — Z09 POSTOPERATIVE FOLLOW-UP: Primary | ICD-10-CM

## 2024-10-17 DIAGNOSIS — M00.9 SEPTIC ARTHRITIS OF INTERPHALANGEAL JOINT OF TOE OF LEFT FOOT: ICD-10-CM

## 2024-10-17 DIAGNOSIS — E11.42 DM TYPE 2 WITH DIABETIC PERIPHERAL NEUROPATHY: ICD-10-CM

## 2024-10-17 PROCEDURE — 10060 I&D ABSCESS SIMPLE/SINGLE: CPT | Mod: S$GLB,,, | Performed by: PODIATRIST

## 2024-10-17 PROCEDURE — 1111F DSCHRG MED/CURRENT MED MERGE: CPT | Mod: CPTII,S$GLB,, | Performed by: PODIATRIST

## 2024-10-17 PROCEDURE — 99024 POSTOP FOLLOW-UP VISIT: CPT | Mod: S$GLB,,, | Performed by: PODIATRIST

## 2024-10-17 PROCEDURE — 3051F HG A1C>EQUAL 7.0%<8.0%: CPT | Mod: CPTII,S$GLB,, | Performed by: PODIATRIST

## 2024-10-17 PROCEDURE — 99214 OFFICE O/P EST MOD 30 MIN: CPT | Mod: 25,S$GLB,, | Performed by: PODIATRIST

## 2024-10-17 PROCEDURE — 1125F AMNT PAIN NOTED PAIN PRSNT: CPT | Mod: CPTII,S$GLB,, | Performed by: PODIATRIST

## 2024-10-17 PROCEDURE — 99999 PR PBB SHADOW E&M-EST. PATIENT-LVL III: CPT | Mod: PBBFAC,,, | Performed by: PODIATRIST

## 2024-10-17 PROCEDURE — 3077F SYST BP >= 140 MM HG: CPT | Mod: CPTII,S$GLB,, | Performed by: PODIATRIST

## 2024-10-17 PROCEDURE — 3078F DIAST BP <80 MM HG: CPT | Mod: CPTII,S$GLB,, | Performed by: PODIATRIST

## 2024-10-17 PROCEDURE — 3008F BODY MASS INDEX DOCD: CPT | Mod: CPTII,S$GLB,, | Performed by: PODIATRIST

## 2024-10-17 PROCEDURE — 1159F MED LIST DOCD IN RCRD: CPT | Mod: CPTII,S$GLB,, | Performed by: PODIATRIST

## 2024-10-18 ENCOUNTER — TELEPHONE (OUTPATIENT)
Dept: ADMINISTRATIVE | Facility: CLINIC | Age: 69
End: 2024-10-18
Payer: MEDICARE

## 2024-10-18 ENCOUNTER — PATIENT OUTREACH (OUTPATIENT)
Dept: ADMINISTRATIVE | Facility: OTHER | Age: 69
End: 2024-10-18
Payer: MEDICARE

## 2024-10-18 NOTE — PROGRESS NOTES
CHW - Initial Contact    This Community Health Worker completed OR updated the Social Determinant of Health questionnaire with patient via telephone today.    Pt identified barriers of most importance are: Pt doesn't need any help with recovery at this time. Foot is healing.   Referrals to community agencies completed with patient/caregiver consent outside of [facility] include: yes  Referrals were put through [facility] - no:   Support and Services: none  Other information discussed the patient needs / wants help with: SDOH   Follow up required: yes  Follow-up Outreach - Due: 10/30/2024

## 2024-10-18 NOTE — PROGRESS NOTES
Pt doesn't need any help with recovery at this time. Foot is healing. I will follow up with pt to make sure he continues to recover well on [facility] platform.

## 2024-10-22 NOTE — PROCEDURES
Incision and Drainage L great toe    Date/Time: 10/17/2024 10:30 AM    Performed by: Yanelis Ayon DPM  Authorized by: Yanelis Ayon DPM    Consent Done?:  Yes (Verbal)    Type:  Abscess  Body area:  Lower extremity  Location details:  Left big toe  Incision depth: subcutaneous    Complexity:  Simple  Drainage:  Serous and purulent  Drainage amount:  Moderate  Wound treatment:  Wound left open and expression of material  Packing material:  Wick placed  Patient tolerance:  Patient tolerated the procedure well with no immediate complications  Pain Assessment: 0

## 2024-10-29 ENCOUNTER — PATIENT OUTREACH (OUTPATIENT)
Dept: ADMINISTRATIVE | Facility: OTHER | Age: 69
End: 2024-10-29
Payer: MEDICARE

## 2024-10-30 ENCOUNTER — OFFICE VISIT (OUTPATIENT)
Dept: PODIATRY | Facility: CLINIC | Age: 69
End: 2024-10-30
Payer: MEDICARE

## 2024-10-30 VITALS
BODY MASS INDEX: 32.8 KG/M2 | HEART RATE: 75 BPM | HEIGHT: 76 IN | DIASTOLIC BLOOD PRESSURE: 76 MMHG | SYSTOLIC BLOOD PRESSURE: 129 MMHG | WEIGHT: 269.31 LBS

## 2024-10-30 DIAGNOSIS — Z48.02 VISIT FOR SUTURE REMOVAL: ICD-10-CM

## 2024-10-30 DIAGNOSIS — S98.111A AMPUTATED GREAT TOE, RIGHT: ICD-10-CM

## 2024-10-30 DIAGNOSIS — M86.9 OSTEOMYELITIS OF FOURTH TOE OF LEFT FOOT: ICD-10-CM

## 2024-10-30 DIAGNOSIS — Z89.422 S/P AMPUTATION OF LESSER TOE, LEFT: ICD-10-CM

## 2024-10-30 DIAGNOSIS — M86.672 CHRONIC OSTEOMYELITIS OF TOE OF LEFT FOOT: Primary | ICD-10-CM

## 2024-10-30 DIAGNOSIS — L97.529 CHRONIC ULCER OF GREAT TOE OF LEFT FOOT, UNSPECIFIED ULCER STAGE: ICD-10-CM

## 2024-10-30 DIAGNOSIS — Z09 POSTOPERATIVE FOLLOW-UP: ICD-10-CM

## 2024-10-30 DIAGNOSIS — M00.9 SEPTIC ARTHRITIS OF INTERPHALANGEAL JOINT OF TOE OF LEFT FOOT: ICD-10-CM

## 2024-10-30 PROCEDURE — 99999 PR PBB SHADOW E&M-EST. PATIENT-LVL IV: CPT | Mod: PBBFAC,,, | Performed by: PODIATRIST

## 2024-10-30 RX ORDER — GENTIAN VIOLET 1% 10 MG/ML
0.5 LIQUID TOPICAL
Status: SHIPPED | OUTPATIENT
Start: 2024-10-30

## 2024-11-01 ENCOUNTER — EXTERNAL HOME HEALTH (OUTPATIENT)
Dept: HOME HEALTH SERVICES | Facility: HOSPITAL | Age: 69
End: 2024-11-01
Payer: MEDICARE

## 2024-11-01 ENCOUNTER — TELEPHONE (OUTPATIENT)
Dept: PODIATRY | Facility: CLINIC | Age: 69
End: 2024-11-01
Payer: MEDICARE

## 2024-11-04 ENCOUNTER — LAB VISIT (OUTPATIENT)
Dept: LAB | Facility: HOSPITAL | Age: 69
End: 2024-11-04
Attending: INTERNAL MEDICINE
Payer: MEDICARE

## 2024-11-04 DIAGNOSIS — M86.672 CHRONIC OSTEOMYELITIS OF HINDFOOT, LEFT: Primary | ICD-10-CM

## 2024-11-04 LAB
ALBUMIN SERPL BCP-MCNC: 3.6 G/DL (ref 3.5–5.2)
ALP SERPL-CCNC: 90 U/L (ref 40–150)
ALT SERPL W/O P-5'-P-CCNC: 14 U/L (ref 10–44)
ANION GAP SERPL CALC-SCNC: 11 MMOL/L (ref 8–16)
AST SERPL-CCNC: 64 U/L (ref 10–40)
BILIRUB SERPL-MCNC: 0.5 MG/DL (ref 0.1–1)
BUN SERPL-MCNC: 24 MG/DL (ref 8–23)
CALCIUM SERPL-MCNC: 9.5 MG/DL (ref 8.7–10.5)
CHLORIDE SERPL-SCNC: 109 MMOL/L (ref 95–110)
CO2 SERPL-SCNC: 21 MMOL/L (ref 23–29)
CREAT SERPL-MCNC: 1.5 MG/DL (ref 0.5–1.4)
CRP SERPL-MCNC: 1.94 MG/L (ref 0–3.19)
ERYTHROCYTE [DISTWIDTH] IN BLOOD BY AUTOMATED COUNT: 17.2 % (ref 11.5–14.5)
ERYTHROCYTE [SEDIMENTATION RATE] IN BLOOD BY PHOTOMETRIC METHOD: 119 MM/HR (ref 0–23)
EST. GFR  (NO RACE VARIABLE): 49.8 ML/MIN/1.73 M^2
GLUCOSE SERPL-MCNC: 120 MG/DL (ref 70–110)
HCT VFR BLD AUTO: 34.4 % (ref 40–54)
HGB BLD-MCNC: 10.4 G/DL (ref 14–18)
MCH RBC QN AUTO: 22.9 PG (ref 27–31)
MCHC RBC AUTO-ENTMCNC: 30.2 G/DL (ref 32–36)
MCV RBC AUTO: 76 FL (ref 82–98)
PLATELET # BLD AUTO: 187 K/UL (ref 150–450)
PMV BLD AUTO: 11.6 FL (ref 9.2–12.9)
POTASSIUM SERPL-SCNC: 4 MMOL/L (ref 3.5–5.1)
PROT SERPL-MCNC: 8.1 G/DL (ref 6–8.4)
RBC # BLD AUTO: 4.55 M/UL (ref 4.6–6.2)
SODIUM SERPL-SCNC: 141 MMOL/L (ref 136–145)
WBC # BLD AUTO: 5.26 K/UL (ref 3.9–12.7)

## 2024-11-04 PROCEDURE — 80053 COMPREHEN METABOLIC PANEL: CPT | Performed by: INTERNAL MEDICINE

## 2024-11-04 PROCEDURE — 85027 COMPLETE CBC AUTOMATED: CPT | Performed by: INTERNAL MEDICINE

## 2024-11-04 PROCEDURE — 86141 C-REACTIVE PROTEIN HS: CPT | Performed by: INTERNAL MEDICINE

## 2024-11-04 PROCEDURE — 85652 RBC SED RATE AUTOMATED: CPT | Performed by: INTERNAL MEDICINE

## 2024-11-20 ENCOUNTER — DOCUMENT SCAN (OUTPATIENT)
Dept: HOME HEALTH SERVICES | Facility: HOSPITAL | Age: 69
End: 2024-11-20
Payer: MEDICARE

## 2024-12-18 NOTE — PROGRESS NOTES
Subjective:     Patient ID: Russell Warner is a 69 y.o. male.    Chief Complaint: Diabetes Mellitus, Nail Care, and Callouses (Left great toe and at bottom and right at the bottom)    Patient is here after > 2 months as he had cancelled & no showed last appts. He is PO L 4th toe. Also, L great toe abscess w/ suspected osteomyelitis.  Completed 4 weeks IV ABX for 4th toe L on 11/02/24. States no pain R plantar foot, just numb but then rates 'pain' level 7/10. Also, c/o  L great toe callus & bottom R foot pain.  Last visit: Healed 4th toe L w/ suture removal. L great toe w/ underlying abscess but no clinical signs of infection. Xrays changes compared w/ prior, suggestive of osteomyelitis - will D/W patient tx options including local wound care vs debridement w/ distal Syme's amp.L hallux on an outpatient basis.    10/30/24  Patient is here for POV L 4th toe amp., DOS 10/4/24, PO 3-1/2 wks.Completes IV Abx Cefazolin in a couple of days & will have PICC line removed. Labs improving &/ or normalized.  Also, wound check L great toe s/p I&D abscess last clinic visit.   Needs R foot check - applying Gentian violet as instructed.    Sedimentation rate  Order: [id number]  Status: Final result       Visible to patient: Yes (not seen)       Next appt: 10/30/2024 at 01:45 PM in Podiatry (Yanelis Ayon DPM)       Dx: Chronic osteomyelitis of hindfoot, left    0 Result Notes         Component Ref Range & Units 12:20 6 d ago 2 wk ago   Sed Rate 0 - 10 mm/Hr 70 High  87 High  101 High    Resulting Agency  [facility]           Narrative  Performed by: [facility]  Send normal result to authorizing provider's In Basket if  patient is active on MyChart:->Yes   Specimen Collected: 10/21/24 12:20 CDT Last Resulted: 10/21/24 15:29 CDT     C-REACTIVE PROTEIN  Order: [id number]  Status: Final result       Visible to patient: Yes (not seen)       Next appt: 10/30/2024 at 01:45 PM in Podiatry (Yanelis Ayon DPM)       Dx:  Chronic osteomyelitis of hindfoot, left    0 Result Notes           Component Ref Range & Units 12:20  (10/21/24) 6 d ago  (10/15/24) 2 wk ago  (10/7/24) 2 wk ago  (10/5/24) 2 wk ago  (10/3/24)   CRP 0.0 - 8.2 mg/L 6.0 27.6 High  24.3 High  40.3 High  72.5 High    Resulting Agency  [facility]           Narrative  Performed by: [facility]  Send normal result to authorizing provider's In Basket if  patient is active on MyChart:->Yes   Specimen Collected: 10/21/24 12:20 CDT Last Resulted: 10/21/24 15:07 CDT      Contains abnormal data CBC W/ AUTO DIFFERENTIAL  Order: [id number]  Status: Final result       Visible to patient: Yes (not seen)       Next appt: 10/30/2024 at 01:45 PM in Podiatry (Yanelis Ayon DPM)       Dx: Chronic osteomyelitis of hindfoot, left    0 Result Notes             Component Ref Range & Units 12:20  (10/21/24) 6 d ago  (10/15/24) 6 d ago  (10/15/24) 2 wk ago  (10/7/24) 2 wk ago  (10/6/24) 2 wk ago  (10/5/24) 2 wk ago  (10/4/24)   WBC 3.90 - 12.70 K/uL 6.50 9.28 7.32 9.51 6.72 6.84 7.04   RBC 4.60 - 6.20 M/uL 4.18 Low  4.25 Low  4.31 Low  4.12 Low  4.03 Low  4.26 Low  3.87 Low    Hemoglobin 14.0 - 18.0 g/dL 9.6 Low  10.0 Low  10.0 Low  9.7 Low  9.4 Low  10.1 Low  9.1 Low    Hematocrit 40.0 - 54.0 % 31.0 Low  31.7 Low  31.8 Low  30.9 Low  30.4 Low  31.7 Low  29.0 Low    MCV 82 - 98 fL 74 Low  75 Low  74 Low  75 Low  75 Low  74 Low  75 Low    MCH 27.0 - 31.0 pg 23.0 Low  23.5 Low  23.2 Low  23.5 Low  23.3 Low  23.7 Low  23.5 Low    MCHC 32.0 - 36.0 g/dL 31.0 Low  31.5 Low  31.4 Low  31.4 Low  30.9 Low  31.9 Low  31.4 Low    RDW 11.5 - 14.5 % 15.3 High  15.4 High  14.8 High  14.9 High  14.8 High  14.7 High  14.8 High    Platelets 150 - 450 K/uL 254 343 353 254 272 258 264   MPV 9.2 - 12.9 fL 10.9 10.6 10.7 9.5 9.8 9.5 10.1   Immature Granulocytes 0.0 - 0.5 % 0.3 0.5 0.1 0.3 0.1 0.1 0.3   Gran # (ANC) 1.8 - 7.7 K/uL 4.0 6.1 4.2 6.9 4.4 4.3 4.5   Immature  Grans (Abs) 0.00 - 0.04 K/uL 0.02 0.05 High  CM 0.01 CM 0.03 CM 0.01 CM 0.01 CM 0.02 CM   Comment: Mild elevation in immature granulocytes is non specific and  can be seen in a variety of conditions including stress response,  acute inflammation, trauma and pregnancy. Correlation with other  laboratory and clinical findings is essential.   Lymph # 1.0 - 4.8 K/uL 1.5 1.5 1.8 1.6 1.4 1.7 1.6   Mono # 0.3 - 1.0 K/uL 0.4 0.8 0.5 0.7 0.5 0.5 0.5   Eos # 0.0 - 0.5 K/uL 0.5 0.7 High  0.7 High  0.3 0.3 0.3 0.4   Baso # 0.00 - 0.20 K/uL 0.03 0.06 0.04 0.03 0.03 0.04 0.05   nRBC 0 /100 WBC 0 0 0 0 0 0 0   Gran % 38.0 - 73.0 % 62.2 66.2 57.7 72.0 65.3 62.2 63.6   Lymph % 18.0 - 48.0 % 23.2 16.4 Low  24.9 16.3 Low  21.4 25.4 22.4   Mono % 4.0 - 15.0 % 6.0 8.4 7.2 7.6 8.0 7.0 7.7   Eosinophil % 0.0 - 8.0 % 7.8 7.9 9.6 High  3.5 4.8 4.7 5.3   Basophil % 0.0 - 1.9 % 0.5 0.6 0.5 0.3 0.4 0.6 0.7   Differential Method  Automated Automated Automated Automated Automated Automated Automated   Resulting Agency  [facility]           Narrative  Performed by: [facility]  Send normal result to authorizing provider's In Basket if  patient is active on MyChart:->Yes   Specimen Collected: 10/21/24 12:20 CDT Last Resulted: 10/21/24 14:23 CDT       10/17/24  Patient is PO subtotal amp.4th toe L, D.O.S 10/04/2024. PO, patient's son had requested both his feet be checked each visit especially as patient is significantly neuropathic. POD 13 days.   Did have 1st dressing change 3 days PO on 10/7/24, prior to DC. +MSSA. On IV Cefazolin 2 g q 8 h 10/7-11/2/24. Missed dose of Abx last night & this am as not being delivered until today. Leukocytosis has resolved; both ESR & CRP remain elevated but are trending down as well.  He was seen as a consult while inpatient 10/3/24 w/ new onset of L foot swelling & wound 4th toe x 1 wk.; noticed by son who was/ is visiting from [place] for his  birthday.  MRI showed septic arthritis PIPJ & DIPJ 4th toe w/ osteomyelitis including distal 1/2 of  proximal phalanx.  Last clinic visit was a month ago & the L great toe ulcer had healed.    10/7/24      Specimen to Pathology, Surgery General Surgery  Order: [id number]  Status: Final result       Visible to patient: Yes (not seen)       Next appt: 10/14/2024 at 01:00 PM in Internal Medicine (Leann Zavala MD)    0 Result Notes      Component 6 d ago   Final Pathologic Diagnosis 1. Left 4th toe, amputation:Toe with acute osteomyelitis and soft tissue abscess.   Comment: Interp By Aleksandar Horton M.D., Signed on 10/10/2024 at 12:25   Gross Patient ID/MRN:  [id number]  Pathology ID/MRN:  [id number]  In formalin, labeled &quot;4th toe left foot&quot;, is a 4.0 by 3.0 x 2.7 cm transphalangeal amputated toe.  The specimen is dry, necrotic.  The skin surface is scaly and has a gray brown coloration.  No toenail is identified.  On cut sections the bone  appears necrotic.  Representative sections that include soft tissue and bone are submitted in cassettes SBS--1-A-B following decalcification.    Abel PEREZ ([facility]) cm   Disclaimer Unless the case is a 'gross only' or additional testing only, the final diagnosis for each specimen is based on a microscopic examination of appropriate tissue sections.   Resulting Agency [facility]              Narrative  Performed by: [facility]  Pre-op Diagnosis: Septic arthritis of left foot [M00.9]  Procedure(s):  AMPUTATION, TOE 4th  Number of specimens: 1  Name of specimens: 1-4th toe bone, left foot  Release to patient->Immediate  Specimen total (fresh, frozen, permanent):->1   Specimen Collected: 10/04/24 12:19 CDT Last Resulted: 10/10/24 12:54 CDT     Aerobic culture  Order: [id number]  Status: Final result       Visible to patient: Yes (not seen)       Next appt: 10/14/2024 at 01:00 PM in Internal Medicine (Leann Zavala MD)    Specimen Information: Toe, Left Foot; Wound   0  Result Notes      Component 6 d ago   Aerobic Bacterial Culture  Abnormal   STAPHYLOCOCCUS AUREUS  Few  Skin beth also present    Resulting Agency [facility]        Susceptibility     Staphylococcus aureus     CULTURE, AEROBIC  (SPECIFY SOURCE)     Clindamycin <=0.5 mcg/mL Sensitive     Erythromycin <=0.5 mcg/mL Sensitive     Oxacillin 0.5 mcg/mL Sensitive     Penicillin >8 mcg/mL Resistant     Tetracycline <=4 mcg/mL Sensitive     Trimeth/Sulfa <=0.5/9.5 m... Sensitive               Linear View        Specimen Collected: 10/04/24 12:19 CDT Last Resulted: 10/07/24 13:21 CDT    Culture, Anaerobe  Order: [id number]  Status: Final result       Visible to patient: Yes (not seen)       Next appt: 10/14/2024 at 01:00 PM in Internal Medicine (Leann Zavala MD)    Specimen Information: Toe, Left Foot; Wound   0 Result Notes      Component 6 d ago   Anaerobic Culture No anaerobes isolated   Resulting Agency [facility]             Specimen Collected: 10/04/24 12:19 CDT Last Resulted: 10/09/24 09:44 CDT       X-Ray Toe 2 or More Views Left  Order: [id number]  Status: Final result       Visible to patient: Yes (not seen)       Next appt: 10/30/2024 at 01:45 PM in Podiatry (Yanelis Ayon DPM)       Dx: Septic arthritis of left foot, due to...    0 Result Notes  Details    Reading Physician Reading Date Result Priority   Darin Morales MD  [phone] 10/4/2024 Routine     Narrative & Impression  EXAMINATION:  XR TOE 2 OR MORE VIEWS LEFT     CLINICAL HISTORY:  postop;  Pyogenic arthritis, unspecified     TECHNIQUE:  Three views of the left toes were performed     COMPARISON:  10/03/2024     FINDINGS:  Interval amputation of the right 4th toe phalanges at the proximal phalanx base.  Similar cortical irregularity at the 1st through 3rd distal phalangeal jose with remote deformity of the distal 2nd metatarsal shaft and 2nd and 3rd metatarsal heads.  Chronic appearing abnormality at the great toe proximal phalanx distally at the IP joint  and medial base, unchanged.  No new sathya osseous destruction.  Similar soft tissue prominence.     Impression:     As above.        Electronically signed by:Darin Morales  Date:                                            10/04/2024  Time:                                           15:54   I independently reviewed the x-ray images.  Other than area of recent subtotal amp.4th toe L, only noted chronic abnormality of great toe as well as 2nd & 3rd ray.    MRI Foot (Forefoot) Left W W/O Contrast  Order: [id number]  Status: Final result       Visible to patient: Yes (not seen)       Next appt: 10/30/2024 at 01:45 PM in Podiatry (Yanelis Ayon DPM)       Dx: Osteomyelitis of fourth toe of left foot    0 Result Notes  Details    Reading Physician Reading Date Result Priority   Waldo Cantu MD  [phone] 10/3/2024 STAT     Narrative & Impression  EXAMINATION:  MRI FOOT (FOREFOOT) LEFT W W/O CONTRAST     CLINICAL HISTORY:  Foot swelling, diabetic, osteomyelitis suspected, xray done;     TECHNIQUE:  Routine MRI evaluation of the left forefoot performed with and without the use of 10 cc of Gadavist IV contrast.     COMPARISON:  Radiographs 10/03/2024.     FINDINGS:  BONES: Confluent marrow edema throughout the 4th proximal, middle and distal phalanges with corresponding T1 medullary hypointensity throughout the middle and distal phalanges and distal half of the proximal phalanx.  Associated osseous erosive changes of the middle and distal phalanges.  Chronic osseous erosive changes of the 1st distal phalanx tuft with associated T1 medullary hypointensity likely sequela chronic osteomyelitis.  Remote healed fracture of the distal aspect of the 2nd metatarsal shaft.  Flattening and subchondral plate irregularity involving the 2nd and 3rd metatarsal heads, likely sequela of chronic AVN with articular surface collapse.     JOINTS: 4th PIP and DIP joint effusions with associated enhancing synovitis.  Scattered  tarsometatarsal, MTP and IP osteoarthritis.  No dislocation.     TENDONS: Marked attenuation of the 4th extensor and flexor tendons at the level of the DIP joints concerning for complete erosion/tears.  Remaining visualized flexor and extensor tendons are intact.     MUSCLES: Enhancing edema and moderate to severe fatty atrophy of the visualized musculature.  No intramuscular fluid collection.     MISCELLANEOUS: Enhancing subcutaneous edema throughout the 4th digit with ulceration at the level of the tuft.  Geographic area of soft tissue non enhancement surrounding the 5th digit and extending along the lateral aspect of the 5th MTP joint.  No rim enhancing fluid collection to suggest an abscess.     Impression:     1. Septic arthritis of the fourth PIP and DIP joints with associated osteomyelitis of the distal and middle phalanges and distal half of the proximal phalanx.  Surrounding cellulitis with distal ulceration.  No abscess.  2. Chronic osteomyelitis of the 1st distal phalanx.  3. Nonviable tissue surrounding the 5th digit and overlying the lateral aspect of the 5th MTP joint.        Electronically signed by:Waldo Cantu MD  Date:                                            10/03/2024  Time:                                           14:42     9/16/24  Patient c/o pain L great toe d/t distal hallux callus & HIPJ as well as middle toe. Also, LLE swelling - states d/t using weedeater L which gets onto legs as unable to wear tight rubber boots. States has pain from nerves; pain level 6/10. On Gabapentin.  6/12/24  Patient was added on to today's pm schedule.  He had stepped on a screw through his shoe & did not know it until put shoe back on & re-injured it; noticed bleeding. Had stepped on screw/ nail in his garage. Went to ED 5/7/24; states & given tetanus & pain med. EMR shows had Xrays, given tetanus vaccine; also PO & topical Abx. F/u w/ PCP for exam 5/14/24.  Here for DM foot care as well - last tx was  3/18/24.  Also, c/o back & hip pain so [facility] - was already on Gabapentin which makes him sleepy anyway so could rest.  4/30/24  Patient is here for wound check L foot, as well as nail care - nails debrided last visit. States has pain level 6/10 d/t neuropathy, back & knee pain, but helped by Gabapentin.  3/18/24  Patient was last here 6 wks.ago for wound check L foot. Patient states Gabapentin helps even w/ qhs sx; only time has some pain is if his BG is elevated when he forgets his Insulin. Having some balance problems d/t absent R hallux; still [facility] for Dm shoes [facility] - has not has any inserts w/ prosthetic filler R 1st amp.though - will contact [facility] in that regard.  2/6/24  Patient presents for wound check. He continues to have pain level of 6/10 B/L unrelated to wound - uses a walking stick d/t back & knee pain. Has chronic pain, neuropathic pain & muscle spasms for which he takes Gabapentin & Lidocaine patches as well as Robaxin respectively per [facility]; needs refill until he can get to them again. Also sees IM & [facility] prn.  2/9/24  Patient is here for wound check L foot. Having pain but not every night; pain level 6/10. Also needs nails cut as they are starting to hurt w/ shoe pressure. On feet a lot as takes care of his 93 y/o mother.  1/2/24  Patient presents new to this podiatry clinic w/ c/o blister on L big toe x months. States it has been hurting on & off recently; pain level 6/10.   States was seen by Dr. Harris twice but was only for toenails & calluses that were trimmed w/ clippers. In the interim, had noted blister after being in a hot tub, w/ pus - went to ED (10/19/23) & given PO as well as topical Abx. Has not tried to self-treat as has had previous problem w/ L great toe leading to amp.  11/8/23 - Dr. Harris  Russell is a 69 y.o. male who presents to the clinic for evaluation and treatment of high risk feet. Russell has a past medical history of Arthritis, Diabetes mellitus, type 2,  Hyperlipidemia, Hypertension, Prostate cancer, and Rotator cuff injury. The patient's chief complaint is long, thick toenails. This patient has documented high risk feet requiring routine maintenance secondary to diabetes mellitis and those secondary complications of diabetes, as mentioned..    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    PCP Leann Zavala, MD10/14/24, due 11/14/24  & [facility] - 10/12/24    Past Medical History:   Diagnosis Date    Arthritis     Diabetes mellitus, type 2     Hyperlipidemia     Hypertension     Prostate cancer     radiation 2017 at [facility]    Rotator cuff injury      Patient Active Problem List   Diagnosis    Type 2 diabetes mellitus with mild nonproliferative retinopathy, with long-term current use of insulin    Essential hypertension    Erectile dysfunction associated with type 2 diabetes mellitus    Acquired absence of right great toe    Chronic back pain    Primary osteoarthritis of both knees    Right shoulder pain    Hip pain    Rotator cuff arthropathy of right shoulder    Lumbar spondylosis    Elevated PSA    Diabetes mellitus due to underlying condition with stage 3 chronic kidney disease    Osteomyelitis of fourth toe of left foot    Type 2 diabetes mellitus with left diabetic foot infection    DM type 2 with diabetic peripheral neuropathy    Postoperative follow-up    History of partial ray amputation of fourth toe of left foot    Chronic kidney disease, stage 3a    Peripheral vascular disease, unspecified   [facility] 5/14/24 A1c 7.0 %  Hemoglobin A1C   Date Value Ref Range Status   10/03/2024 7.2 (H) 4.0 - 5.6 % Final     Comment:     ADA Screening Guidelines:  5.7-6.4%  Consistent with prediabetes  >or=6.5%  Consistent with diabetes    High levels of fetal hemoglobin interfere with the HbA1C  assay. Heterozygous hemoglobin variants (HbS, HgC, etc)do  not significantly interfere with this assay.   However, presence of multiple variants may affect accuracy.      10/16/2023 7.3 (A) 4.2 - 5.8 % Final   11/02/2022 6.3 (H) 4.0 - 5.6 % Final     Comment:     ADA Screening Guidelines:  5.7-6.4%  Consistent with prediabetes  >or=6.5%  Consistent with diabetes    High levels of fetal hemoglobin interfere with the HbA1C  assay. Heterozygous hemoglobin variants (HbS, HgC, etc)do  not significantly interfere with this assay.   However, presence of multiple variants may affect accuracy.       Review of Systems   Constitutional: Negative for malaise/fatigue.   Cardiovascular:  Negative for claudication and leg swelling.   Skin:  Positive for color change, dry skin, nail changes, poor wound healing, rash and suspicious lesions. Negative for itching.   Musculoskeletal:  Positive for arthritis, back pain, joint pain, muscle weakness, myalgias and neck pain. Negative for falls, joint swelling and muscle cramps.   Gastrointestinal:  Negative for nausea and vomiting.   Neurological:  Positive for loss of balance, numbness, paresthesias and sensory change. Negative for focal weakness and weakness.   Psychiatric/Behavioral:  The patient is not nervous/anxious.       Objective:     Physical Exam  Vitals reviewed.   Constitutional:       Appearance: He is well-developed. He is obese.   Cardiovascular:      Pulses:           Dorsalis pedis pulses are 1+ on the right side and 1+ on the left side.   Musculoskeletal:         General: Tenderness (L great toe but only 'when hits it') and signs of injury (LLE - weedeater about week PTA 9/16/24) present. No swelling. Normal range of motion.      Right lower leg: Edema present.      Left lower leg: Edema present.      Right foot: Deformity (significant hammertoes B/L R>L) present.      Left foot: Deformity (hallux interphalangeus L) present.        Feet:       Comments: Adequate joint range of motion without pain, limitation, nor crepitation Bilateral feet and ankle joints. Muscle strength is 5/5 in all groups bilaterally.         Feet:      Right foot:       Skin integrity: Dry skin present. No ulcer, blister, erythema, warmth or callus.      Toenail Condition: Fungal disease present.     Left foot:      Skin integrity: Ulcer, callus and dry skin present. No blister (sequelae L hallux digital tuft), erythema or warmth.      Toenail Condition: Fungal disease present.     Comments: Equinus B/L ankles w/ < 90 deg foot to leg noted w/ knees extended.      MS strength of extrinsics to foot & ankle B/L + 5/5 in DF/PF/Inv/Ev to resistance w/ no reproduction of pain in any direction.     Passive ROM of ankle & pedal joints is painless & w/out crepitation B/L.    Toenails 1st L, 2nd-4th R, & 5th  B/L are thickened, dystrophic, discolored. 2nd & 3rd L are trimmed short.  Absent toe 4th L..     Skin:     General: Skin is cool and dry.      Capillary Refill: Capillary refill takes 2 to 3 seconds.      Findings: Lesion present. No bruising, ecchymosis or erythema.      Comments: Toes are cool to touch, feet are warm proximally BLE.  Decreased digital hair & sparse hair growth BK B/L.   Skin is dry & atrophic, slightly hyperpigmented, mildly edematous B/L.    Amp.stump 4th L w/ sutures intact, healed well by primary intention; NSI, no erythema nor edema.    Distal medial digital tuft L hallux w/ clavus; no underlying fluctuance palpable. W/ debridement, no underlying exudate nor malodor. Ulceration 1.2 x 0.5 cm w/out depth & no remaining underlying maceration.    No maceration web spaces B/L (previous L medial base of 5th toe) w/out erythema.    IPK sub 5th met.head L >R.    Amp.R great toe w/out overlying skin abnormality.    Puncture wound healed plantar lateral 1st MPJ R.    IPK sub 5th met.head L & medial HIPJ as well as medial 1st met.head L.  hd 3 PIPJ L.    Neurological:      Mental Status: He is alert and oriented to person, place, and time.      Sensory: Sensory deficit present.      Motor: Motor function is intact. No weakness or abnormal muscle tone.      Gait: Gait  is intact. Gait normal.      Comments: SWMF testing is diminished B/L; sharp/dull sensation & light touch diminished.    Paresthesias including numbness & burning qhs B/L feet w/ no clearly identified trigger or source; no hyperemia.  No TTP tarsal tunnel B/L nor w/ palpation IMS B/L.   Psychiatric:         Mood and Affect: Mood and affect normal. Mood is not anxious.         Behavior: Behavior normal. Behavior is cooperative.         Cognition and Memory: Cognition is impaired.     3/18/24      2/9/24      Assessment:      Encounter Diagnoses   Name Primary?    DM type 2 with diabetic peripheral neuropathy     Pain in right foot Yes    Acquired adductovarus rotation of toe of left foot     [place] or callus     Pain due to onychomycosis of toenails of both feet     PVD (peripheral vascular disease)     S/P amputation of lesser toe, left     Acquired absence of right great toe     Chronic osteomyelitis of toe of left foot     Ulcer of toe, left, limited to breakdown of skin      Problem List Items Addressed This Visit          Endocrine    DM type 2 with diabetic peripheral neuropathy    Relevant Orders    Routine Foot Care       Orthopedic    Acquired absence of right great toe     Other Visit Diagnoses       Pain in right foot    -  Primary    Relevant Orders    Routine Foot Care    Acquired adductovarus rotation of toe of left foot        Relevant Orders    Routine Foot Care    [place] or callus        Relevant Orders    Routine Foot Care    Wound Debridement L hallux distal medial tuft    Pain due to onychomycosis of toenails of both feet        Relevant Orders    Routine Foot Care    PVD (peripheral vascular disease)        Relevant Orders    Wound Debridement L hallux distal medial tuft    S/P amputation of lesser toe, left        Chronic osteomyelitis of toe of left foot        Ulcer of toe, left, limited to breakdown of skin              Plan:     Russell was seen today for diabetes mellitus, nail care and  callouses.    Diagnoses and all orders for this visit:    Pain in right foot  -     Routine Foot Care    DM type 2 with diabetic peripheral neuropathy  -     Routine Foot Care    Acquired adductovarus rotation of toe of left foot  -     Routine Foot Care    [place] or callus  -     Routine Foot Care  -     Wound Debridement L hallux distal medial tuft    Pain due to onychomycosis of toenails of both feet  -     Routine Foot Care    PVD (peripheral vascular disease)  -     Wound Debridement L hallux distal medial tuft    S/P amputation of lesser toe, left    Acquired absence of right great toe    Chronic osteomyelitis of toe of left foot    Ulcer of toe, left, limited to breakdown of skin    I counseled the patient on his conditions, their implications & medical mgmt.    - Shoe inspection. Diabetic Foot Education. Patient reminded of the importance of good blood sugar control to help prevent podiatric complications of diabetes. Patient instructed on proper foot hygeine. We discussed wearing proper shoe gear, daily foot inspections, never walking w/out protective shoe gear, annual or twice annual DM foot exam, sooner prn.      Continue Gabapentin 300 mg tid.    [facility] DM [facility] & 3/25/24 visit here - Rx for R filler prosthesis for absent R great toe.    W/ patient's permission, the toenails mentioned above were aggressively reduced & debrided to their soft tissue attachment, using a nail nipper, removing all offending nail & debris. Utilizing a #15 scalpel, I trimmed the corns and calluses at the above mentioned location.      The patient will continue to monitor the areas daily, inspect the feet, wear protective shoe gear when ambulatory, and moisturizer to maintain skin integrity.     Debridement distal L great toe.   Dressing including Betadine-soaked 2x2 wick, 4x4 digital splints & Coban.  To continue w/ wet-dry Betadine dressing changes qod, or as needed if soiled.  Wound check 2 wks., sooner prn.    F/u  3 months for DM foot care, sooner prn.        A total of 29  mins.was spent on chart review, patient visit & documentation.    X-Ray Toe 2 or More Views Left  Order: [id number]  Status: Final result       Visible to patient: Yes (not seen)       Next appt: 11/14/2024 at 01:30 PM in Internal Medicine (Leann Zavala MD)       Dx: Abscess of great toe, left; Chronic o...    0 Result Notes  Details    Reading Physician Reading Date Result Priority   Pepe Padilla MD  [phone] 10/30/2024 Routine     Narrative & Impression  EXAMINATION:  XR TOE 2 OR MORE VIEWS LEFT     CLINICAL HISTORY:  Cutaneous abscess of left foot     TECHNIQUE:  Three views of the left toes were performed     COMPARISON:  10/04/2024     FINDINGS:  There are new erosive changes of the 1st distal phalanx with an associated pathologic fracture consistent with osteomyelitis.  There is associated soft tissue swelling of the 1st toe with a soft tissue defect along the medial aspect of the tuft.     Otherwise stable appearance of the left toes including postoperative changes related to partial amputation of the 4th toe and degenerative changes.     Impression:     Findings consistent with osteomyelitis involving the 1st distal phalanx with an associated pathologic fracture.     This report was flagged in Epic as abnormal.        Electronically signed by:Pepe Padilla  Date:                                            10/30/2024  Time:                                           14:40   I independently reviewed the x-ray images compared them w/ previous x-rays show the changes as above, consistent w/ chronic recurrent ulceration.

## 2025-01-06 ENCOUNTER — OFFICE VISIT (OUTPATIENT)
Dept: PODIATRY | Facility: CLINIC | Age: 70
End: 2025-01-06
Payer: MEDICARE

## 2025-01-06 VITALS
SYSTOLIC BLOOD PRESSURE: 125 MMHG | WEIGHT: 270.94 LBS | HEIGHT: 76 IN | DIASTOLIC BLOOD PRESSURE: 76 MMHG | HEART RATE: 72 BPM | BODY MASS INDEX: 32.99 KG/M2

## 2025-01-06 DIAGNOSIS — Z89.411 ACQUIRED ABSENCE OF RIGHT GREAT TOE: ICD-10-CM

## 2025-01-06 DIAGNOSIS — M20.5X2 ACQUIRED ADDUCTOVARUS ROTATION OF TOE OF LEFT FOOT: ICD-10-CM

## 2025-01-06 DIAGNOSIS — B35.1 PAIN DUE TO ONYCHOMYCOSIS OF TOENAILS OF BOTH FEET: ICD-10-CM

## 2025-01-06 DIAGNOSIS — Z89.422 S/P AMPUTATION OF LESSER TOE, LEFT: ICD-10-CM

## 2025-01-06 DIAGNOSIS — M79.671 PAIN IN RIGHT FOOT: Primary | ICD-10-CM

## 2025-01-06 DIAGNOSIS — M86.672 CHRONIC OSTEOMYELITIS OF TOE OF LEFT FOOT: ICD-10-CM

## 2025-01-06 DIAGNOSIS — L84 CORN OR CALLUS: ICD-10-CM

## 2025-01-06 DIAGNOSIS — M79.674 PAIN DUE TO ONYCHOMYCOSIS OF TOENAILS OF BOTH FEET: ICD-10-CM

## 2025-01-06 DIAGNOSIS — M79.675 PAIN DUE TO ONYCHOMYCOSIS OF TOENAILS OF BOTH FEET: ICD-10-CM

## 2025-01-06 DIAGNOSIS — L97.521 ULCER OF TOE, LEFT, LIMITED TO BREAKDOWN OF SKIN: ICD-10-CM

## 2025-01-06 DIAGNOSIS — I73.9 PVD (PERIPHERAL VASCULAR DISEASE): ICD-10-CM

## 2025-01-06 DIAGNOSIS — E11.42 DM TYPE 2 WITH DIABETIC PERIPHERAL NEUROPATHY: ICD-10-CM

## 2025-01-06 PROCEDURE — 11056 PARNG/CUTG B9 HYPRKR LES 2-4: CPT | Mod: S$GLB,,, | Performed by: PODIATRIST

## 2025-01-06 PROCEDURE — 11721 DEBRIDE NAIL 6 OR MORE: CPT | Mod: 59,S$GLB,, | Performed by: PODIATRIST

## 2025-01-06 PROCEDURE — 99214 OFFICE O/P EST MOD 30 MIN: CPT | Mod: 25,S$GLB,, | Performed by: PODIATRIST

## 2025-01-06 PROCEDURE — 3288F FALL RISK ASSESSMENT DOCD: CPT | Mod: CPTII,S$GLB,, | Performed by: PODIATRIST

## 2025-01-06 PROCEDURE — 3074F SYST BP LT 130 MM HG: CPT | Mod: CPTII,S$GLB,, | Performed by: PODIATRIST

## 2025-01-06 PROCEDURE — 1159F MED LIST DOCD IN RCRD: CPT | Mod: CPTII,S$GLB,, | Performed by: PODIATRIST

## 2025-01-06 PROCEDURE — 1125F AMNT PAIN NOTED PAIN PRSNT: CPT | Mod: CPTII,S$GLB,, | Performed by: PODIATRIST

## 2025-01-06 PROCEDURE — 3078F DIAST BP <80 MM HG: CPT | Mod: CPTII,S$GLB,, | Performed by: PODIATRIST

## 2025-01-06 PROCEDURE — 1101F PT FALLS ASSESS-DOCD LE1/YR: CPT | Mod: CPTII,S$GLB,, | Performed by: PODIATRIST

## 2025-01-06 PROCEDURE — 97597 DBRDMT OPN WND 1ST 20 CM/<: CPT | Mod: 59,S$GLB,, | Performed by: PODIATRIST

## 2025-01-06 PROCEDURE — 3008F BODY MASS INDEX DOCD: CPT | Mod: CPTII,S$GLB,, | Performed by: PODIATRIST

## 2025-01-06 PROCEDURE — 99999 PR PBB SHADOW E&M-EST. PATIENT-LVL III: CPT | Mod: PBBFAC,,, | Performed by: PODIATRIST

## 2025-01-11 ENCOUNTER — DOCUMENT SCAN (OUTPATIENT)
Dept: HOME HEALTH SERVICES | Facility: HOSPITAL | Age: 70
End: 2025-01-11
Payer: MEDICARE

## 2025-01-13 NOTE — PROCEDURES
Routine Foot Care    Date/Time: 1/6/2025 2:30 PM    Performed by: Yanelis Ayon DPM  Authorized by: Yanelis Ayon DPM    Consent Done?:  Yes (Verbal)  Hyperkeratotic Skin Lesions?: Yes    Number of trimmed lesions:  3  Location(s):  Left 1st Toe, Left 1st Metatarsal Head and Left 5th Metatarsal Head    Nail Care Type:  Debride(Left 1st Toe, Left 4th Toe, Left 5th Toe, Right 1st Toe, Right 2nd Toe, Right 3rd Toe, Right 5th Toe and Right 4th Toe)  Patient tolerance:  Patient tolerated the procedure well with no immediate complications  Wound Debridement L hallux distal medial tuft    Date/Time: 1/6/2025 2:30 PM    Performed by: Yanelis Ayon DPM  Authorized by: Yanelis Ayon DPM    Consent Done?:  Yes (Verbal)    Wound Details:    Location:  Left foot    Location:  Left 1st Toe    Type of Debridement:  Excisional       Length (cm):  1.2       Width (cm):  0.5       Depth (cm):  0.1       Area (sq cm):  0.6       Percent Debrided (%):  100       Total Area Debrided (sq cm):  0.6    Depth of debridement:  Epidermis/Dermis    Tissue debrided:  Epidermis and Dermis    Devitalized tissue debrided:  Callus    Instruments:  Nippers and Blade  Bleeding:  None  Patient tolerance:  Patient tolerated the procedure well with no immediate complications

## 2025-01-16 DIAGNOSIS — E11.9 TYPE 2 DIABETES MELLITUS WITHOUT COMPLICATION: ICD-10-CM

## 2025-04-30 DIAGNOSIS — E11.9 TYPE 2 DIABETES MELLITUS WITHOUT COMPLICATION: ICD-10-CM

## 2025-07-02 ENCOUNTER — PATIENT MESSAGE (OUTPATIENT)
Dept: ADMINISTRATIVE | Facility: HOSPITAL | Age: 70
End: 2025-07-02
Payer: OTHER GOVERNMENT

## 2025-08-27 DIAGNOSIS — E11.9 TYPE 2 DIABETES MELLITUS WITHOUT COMPLICATION: ICD-10-CM
